# Patient Record
Sex: FEMALE | Race: ASIAN | Employment: OTHER | URBAN - METROPOLITAN AREA
[De-identification: names, ages, dates, MRNs, and addresses within clinical notes are randomized per-mention and may not be internally consistent; named-entity substitution may affect disease eponyms.]

---

## 2013-11-07 LAB — COLONOSCOPY, EXTERNAL: NORMAL

## 2017-01-30 DIAGNOSIS — M53.3 COCCYDYNIA: Primary | ICD-10-CM

## 2017-01-30 DIAGNOSIS — M53.3 DISORDERS OF SACRUM: Primary | ICD-10-CM

## 2017-01-30 RX ORDER — METAXALONE 800 MG/1
800 TABLET ORAL 3 TIMES DAILY
Qty: 30 TAB | Refills: 1 | Status: SHIPPED | OUTPATIENT
Start: 2017-01-30 | End: 2017-07-31 | Stop reason: SDUPTHER

## 2017-02-02 ENCOUNTER — TELEPHONE (OUTPATIENT)
Dept: INTERNAL MEDICINE CLINIC | Age: 72
End: 2017-02-02

## 2017-02-02 NOTE — TELEPHONE ENCOUNTER
[address] [facility]           Patient states he's returning your call. Please call.  Thank you

## 2017-02-09 ENCOUNTER — OFFICE VISIT (OUTPATIENT)
Dept: INTERNAL MEDICINE CLINIC | Age: 72
End: 2017-02-09

## 2017-02-09 VITALS
DIASTOLIC BLOOD PRESSURE: 75 MMHG | BODY MASS INDEX: 18.54 KG/M2 | OXYGEN SATURATION: 96 % | WEIGHT: 98.2 LBS | SYSTOLIC BLOOD PRESSURE: 120 MMHG | TEMPERATURE: 97.9 F | HEIGHT: 61 IN | HEART RATE: 72 BPM

## 2017-02-09 DIAGNOSIS — H26.9 CATARACT: Primary | ICD-10-CM

## 2017-02-09 NOTE — PROGRESS NOTES
HISTORY OF PRESENT ILLNESS  Dami Myers is a 66 y.o. female. HPI   Pt here need preop clearance for left cataract and glaucoma sx--Dr Birdie Malik  Has had right eye sx x 2--no complication with anesthesia or sedation  Feels ok  No cp sob or f/c  Only dry dry throat and pain in coccyx area    Patient Active Problem List    Diagnosis Date Noted    HLD (hyperlipidemia) 03/10/2014    Epigastric pain 11/07/2013    Blood in stool 11/07/2013    Constipation 11/07/2013    Occult blood positive stool 11/07/2013    Interstitial cystitis 02/24/2011    Asthma 02/12/2010    Fibromyalgia 02/12/2010    Osteoporosis 02/12/2010    Vitamin D deficiency 02/12/2010     Current Outpatient Prescriptions   Medication Sig Dispense Refill    montelukast (SINGULAIR) 10 mg tablet TK 1 T PO IN THE MAGDIEL  5    Magnesium Oxide 500 mg cap Take  by mouth.  CALCIUM CARBONATE/VITAMIN D3 (CALCIUM 600 + D PO) Take  by mouth.  metaxalone (SKELAXIN) 800 mg tablet Take 1 Tab by mouth three (3) times daily. 30 Tab 1    bimatoprost (LUMIGAN) 0.03 % ophthalmic drops Administer 1 drop to both eyes every evening.  brinzolamide (AZOPT) 1 % ophthalmic suspension Administer 1 Drop to both eyes three (3) times daily.  latanoprost (XALATAN) 0.005 % ophthalmic solution Administer 1 Drop to both eyes nightly.          No Known Allergies   Lab Results  Component Value Date/Time   WBC 5.5 09/10/2013 11:19 AM   HGB 12.9 09/10/2013 11:19 AM   HCT 40.1 09/10/2013 11:19 AM   PLATELET 029 48/91/8252 11:19 AM   MCV 96 09/10/2013 11:19 AM       Lab Results  Component Value Date/Time   GFR est AA 99 10/14/2015 09:52 AM   GFR est non-AA 86 10/14/2015 09:52 AM   Creatinine 0.71 10/14/2015 09:52 AM   BUN 14 10/14/2015 09:52 AM   Sodium 141 10/14/2015 09:52 AM   Potassium 5.0 10/14/2015 09:52 AM   Chloride 102 10/14/2015 09:52 AM   CO2 27 10/14/2015 09:52 AM         ROS    Physical Exam   Constitutional: She appears well-developed and well-nourished. Appears stated age   HENT:   Right upper lid ptosis   Eyes: Pupils are equal, round, and reactive to light. Neck: Normal range of motion. Neck supple. No JVD present. No tracheal deviation present. Cardiovascular: Normal rate, regular rhythm, normal heart sounds and intact distal pulses. Exam reveals no gallop and no friction rub. No murmur heard. Pulmonary/Chest: Effort normal and breath sounds normal. No respiratory distress. She has no wheezes. Abdominal: Soft. Bowel sounds are normal. She exhibits no mass. There is no tenderness. There is no rebound and no guarding. Musculoskeletal: She exhibits no edema. Lymphadenopathy:     She has no cervical adenopathy. Neurological: She is alert. Psychiatric: She has a normal mood and affect. Nursing note and vitals reviewed. ASSESSMENT and PLAN  Mani Newman was seen today for pre-op exam and sore throat.     Diagnoses and all orders for this visit:    Cataract   Acceptable candidate and risk for surgery   Will complete preop forms    Glaucoma   As per above      Follow-up Disposition: Not on File

## 2017-02-09 NOTE — MR AVS SNAPSHOT
Visit Information Date & Time Provider Department Dept. Phone Encounter #  
 2/9/2017 11:15 AM Arnulfo Gross, [address] [phone] [id number] Your Appointments 7/31/2017  9:30 AM  
ROUTINE CARE with Arnulfo Gross, [address] [facility]) Appt Note: 6 month follow up; r/s  
 [address] [address]  
[address] 235 [facility] [address] [place] [address] Upcoming Health Maintenance Date Due DTaP/Tdap/Td series (1 - Tdap) 7/7/1965 ZOSTER VACCINE AGE 60> 7/7/2004 Pneumococcal 65+ Low/Medium Risk (2 of 2 - PPSV23) 2/24/2016 BREAST CANCER SCRN MAMMOGRAM 3/24/2017 MEDICARE YEARLY EXAM 11/1/2017 GLAUCOMA SCREENING Q2Y 10/26/2018 COLONOSCOPY 11/7/2018 Allergies as of 2/9/2017  Review Complete On: 10/31/2016 By: Arnulfo Gross MD  
 No Known Allergies Current Immunizations  Reviewed on 10/31/2016 Name Date Influenza High Dose Vaccine PF 10/4/2016 Influenza Vaccine 9/10/2013 Influenza Vaccine Split 9/28/2011 Pneumococcal Vaccine (Unspecified Type) 2/24/2011 Not reviewed this visit You Were Diagnosed With   
  
 Codes Comments Cataract    -  Primary ICD-10-CM: H26.9 ICD-9-CM: 366.9 Vitals BP Pulse Temp Height(growth percentile) Weight(growth percentile) SpO2  
 120/75 (BP 1 Location: Left arm, BP Patient Position: Sitting) 72 97.9 °F (36.6 °C) (Oral) 5' 1\" (1.549 m) 98 lb 3.2 oz (44.5 kg) 96% BMI OB Status Smoking Status 18.55 kg/m2 Postmenopausal Never Smoker BMI and BSA Data Body Mass Index Body Surface Area 18.55 kg/m 2 1.38 m 2 Preferred Pharmacy Pharmacy Name Phone [facility], [address] [place], [place] [address]vd AT [facility] [phone] Your Updated Medication List  
  
   
 This list is accurate as of: 2/9/17 12:07 PM.  Always use your most recent med list.  
  
  
  
  
 AZOPT 1 % ophthalmic suspension Generic drug:  brinzolamide Administer 1 Drop to both eyes three (3) times daily. CALCIUM 600 + D PO Take  by mouth.  
  
 latanoprost 0.005 % ophthalmic solution Commonly known as:  Vertie  Administer 1 Drop to both eyes nightly. LUMIGAN 0.03 % ophthalmic drops Generic drug:  bimatoprost  
Administer 1 drop to both eyes every evening. Magnesium Oxide 500 mg Cap Take  by mouth.  
  
 metaxalone 800 mg tablet Commonly known as:  SKELAXIN Take 1 Tab by mouth three (3) times daily. montelukast 10 mg tablet Commonly known as:  SINGULAIR TK 1 T PO IN THE MAGDIEL hospitals & HEALTH SERVICES! Kayley Lerner introduces Hipcamp patient portal. Now you can access parts of your medical record, email your doctor's office, and request medication refills online. 1. In your internet browser, go to [email or web address] 2. Click on the First Time User? Click Here link in the Sign In box. You will see the New Member Sign Up page. 3. Enter your Hipcamp Access Code exactly as it appears below. You will not need to use this code after youve completed the sign-up process. If you do not sign up before the expiration date, you must request a new code. · Hipcamp Access Code: [email or web address] Expires: 5/10/2017 12:07 PM 
 
4. Enter the last four digits of your Social Security Number (xxxx) and Date of Birth (mm/dd/yyyy) as indicated and click Submit. You will be taken to the next sign-up page. 5. Create a Hipcamp ID. This will be your Hipcamp login ID and cannot be changed, so think of one that is secure and easy to remember. 6. Create a Hipcamp password. You can change your password at any time. 7. Enter your Password Reset Question and Answer. This can be used at a later time if you forget your password. 8. Enter your e-mail address. You will receive e-mail notification when new information is available in [address] 9. Click Sign Up. You can now view and download portions of your medical record. 10. Click the Download Summary menu link to download a portable copy of your medical information. If you have questions, please visit the Frequently Asked Questions section of the bTendo website. Remember, bTendo is NOT to be used for urgent needs. For medical emergencies, dial 911. Now available from your iPhone and Android! Please provide this summary of care documentation to your next provider. Your primary care clinician is listed as Ami PERERA. If you have any questions after today's visit, please call [phone].

## 2017-07-30 DIAGNOSIS — M79.7 FIBROMYALGIA: ICD-10-CM

## 2017-07-30 DIAGNOSIS — Z12.31 VISIT FOR SCREENING MAMMOGRAM: ICD-10-CM

## 2017-07-30 DIAGNOSIS — E78.00 PURE HYPERCHOLESTEROLEMIA: ICD-10-CM

## 2017-07-30 DIAGNOSIS — M85.80 OSTEOPENIA, UNSPECIFIED LOCATION: Primary | ICD-10-CM

## 2017-07-31 ENCOUNTER — OFFICE VISIT (OUTPATIENT)
Dept: INTERNAL MEDICINE CLINIC | Age: 72
End: 2017-07-31

## 2017-07-31 ENCOUNTER — HOSPITAL ENCOUNTER (OUTPATIENT)
Dept: LAB | Age: 72
Discharge: HOME | End: 2017-07-31
Payer: MEDICARE

## 2017-07-31 VITALS
HEART RATE: 70 BPM | HEIGHT: 61 IN | SYSTOLIC BLOOD PRESSURE: 111 MMHG | OXYGEN SATURATION: 98 % | BODY MASS INDEX: 18.88 KG/M2 | DIASTOLIC BLOOD PRESSURE: 68 MMHG | WEIGHT: 100 LBS | TEMPERATURE: 97.4 F

## 2017-07-31 DIAGNOSIS — M54.2 NECK PAIN: ICD-10-CM

## 2017-07-31 DIAGNOSIS — Z12.39 BREAST SCREENING: ICD-10-CM

## 2017-07-31 DIAGNOSIS — Z12.31 VISIT FOR SCREENING MAMMOGRAM: ICD-10-CM

## 2017-07-31 DIAGNOSIS — E78.00 PURE HYPERCHOLESTEROLEMIA: Primary | ICD-10-CM

## 2017-07-31 DIAGNOSIS — M81.0 OSTEOPOROSIS, UNSPECIFIED OSTEOPOROSIS TYPE, UNSPECIFIED PATHOLOGICAL FRACTURE PRESENCE: ICD-10-CM

## 2017-07-31 PROCEDURE — 84443 ASSAY THYROID STIM HORMONE: CPT

## 2017-07-31 PROCEDURE — 80048 BASIC METABOLIC PNL TOTAL CA: CPT

## 2017-07-31 PROCEDURE — 85027 COMPLETE CBC AUTOMATED: CPT

## 2017-07-31 PROCEDURE — 36415 COLL VENOUS BLD VENIPUNCTURE: CPT

## 2017-07-31 PROCEDURE — 80061 LIPID PANEL: CPT

## 2017-07-31 RX ORDER — CYCLOBENZAPRINE HCL 10 MG
10 TABLET ORAL
Qty: 90 TAB | Refills: 1 | OUTPATIENT
Start: 2017-07-31

## 2017-07-31 RX ORDER — METAXALONE 800 MG/1
800 TABLET ORAL 3 TIMES DAILY
Qty: 30 TAB | Refills: 1 | Status: SHIPPED | OUTPATIENT
Start: 2017-07-31 | End: 2017-07-31

## 2017-07-31 RX ORDER — CYCLOBENZAPRINE HCL 5 MG
5 TABLET ORAL
Qty: 30 TAB | Refills: 3 | Status: SHIPPED | OUTPATIENT
Start: 2017-07-31 | End: 2017-11-07

## 2017-07-31 NOTE — MR AVS SNAPSHOT
Visit Information Date & Time Provider Department Dept. Phone Encounter #  
 7/31/2017  9:30 AM Aylin Summers, [address] [place] [address],[address] [phone] [id number] Follow-up Instructions Return in about 6 months (around 1/31/2018) for hld fibromyalgia. Upcoming Health Maintenance Date Due DTaP/Tdap/Td series (1 - Tdap) 7/7/1965 ZOSTER VACCINE AGE 60> 5/7/2004 BREAST CANCER SCRN MAMMOGRAM 3/24/2017 INFLUENZA AGE 9 TO ADULT 8/1/2017 MEDICARE YEARLY EXAM 11/1/2017 GLAUCOMA SCREENING Q2Y 10/26/2018 COLONOSCOPY 11/7/2018 Allergies as of 7/31/2017  Review Complete On: 7/31/2017 By: Aylin Summers MD  
 No Known Allergies Current Immunizations  Reviewed on 10/31/2016 Name Date Influenza High Dose Vaccine PF 10/4/2016 Influenza Vaccine 9/10/2013 Influenza Vaccine Split 9/28/2011 ZZZ-RETIRED (DO NOT USE) Pneumococcal Vaccine (Unspecified Type) 2/24/2011 Not reviewed this visit You Were Diagnosed With   
  
 Codes Comments Pure hypercholesterolemia    -  Primary ICD-10-CM: E78.00 ICD-9-CM: 272.0 Osteoporosis, unspecified osteoporosis type, unspecified pathological fracture presence     ICD-10-CM: M81.0 ICD-9-CM: 733.00 Breast screening     ICD-10-CM: Z12.39 
ICD-9-CM: V76.10 Visit for screening mammogram     ICD-10-CM: Z12.31 
ICD-9-CM: V76.12 Neck pain     ICD-10-CM: M54.2 ICD-9-CM: 723.1 Vitals BP Pulse Temp Height(growth percentile) Weight(growth percentile) SpO2  
 111/68 (BP 1 Location: Left arm, BP Patient Position: Sitting) 70 97.4 °F (36.3 °C) (Oral) 5' 1\" (1.549 m) 100 lb (45.4 kg) 98% BMI OB Status Smoking Status 18.89 kg/m2 Postmenopausal Never Smoker BMI and BSA Data Body Mass Index Body Surface Area  
 18.89 kg/m 2 1.4 m 2 Preferred Pharmacy Pharmacy Name Phone [facility] Judge Lopez [address] [phone] Your Updated Medication List  
  
   
This list is accurate as of: 7/31/17 10:36 AM.  Always use your most recent med list.  
  
  
  
  
 AZOPT 1 % ophthalmic suspension Generic drug:  brinzolamide Administer 1 Drop to both eyes three (3) times daily. CALCIUM 600 + D PO Take  by mouth.  
  
 latanoprost 0.005 % ophthalmic solution Commonly known as:  Bonna Billow Administer 1 Drop to both eyes nightly. LUMIGAN 0.03 % ophthalmic drops Generic drug:  bimatoprost  
Administer 1 drop to both eyes every evening. Magnesium Oxide 500 mg Cap Take  by mouth.  
  
 metaxalone 800 mg tablet Commonly known as:  SKELAXIN Take 1 Tab by mouth three (3) times daily. montelukast 10 mg tablet Commonly known as:  SINGULAIR TK 1 T PO IN THE MAGDIEL PULMICORT FLEXHALER 180 mcg/actuation Aepb inhaler Generic drug:  budesonide Take  by inhalation. Prescriptions Sent to Pharmacy Refills  
 metaxalone (SKELAXIN) 800 mg tablet 1 Sig: Take 1 Tab by mouth three (3) times daily. Class: Normal  
 Pharmacy: [address] #: [phone] Route: Oral  
  
Follow-up Instructions Return in about 6 months (around 1/31/2018) for hld fibromyalgia. To-Do List   
 08/21/2017 Imaging:  [facility] MAMMO BI SCREENING INCL CAD Introducing [facility] & HEALTH SERVICES! Jasmin Deng introduces Quaero patient portal. Now you can access parts of your medical record, email your doctor's office, and request medication refills online. 1. In your internet browser, go to [email or web address] 2. Click on the First Time User? Click Here link in the Sign In box. You will see the New Member Sign Up page. 3. Enter your Quaero Access Code exactly as it appears below. You will not need to use this code after youve completed the sign-up process. If you do not sign up before the expiration date, you must request a new code. · Eco-Vacay Access Code: [email or web address] Expires: 10/29/2017 10:36 AM 
 
4. Enter the last four digits of your Social Security Number (xxxx) and Date of Birth (mm/dd/yyyy) as indicated and click Submit. You will be taken to the next sign-up page. 5. Create a Eco-Vacay ID. This will be your Eco-Vacay login ID and cannot be changed, so think of one that is secure and easy to remember. 6. Create a Eco-Vacay password. You can change your password at any time. 7. Enter your Password Reset Question and Answer. This can be used at a later time if you forget your password. 8. Enter your e-mail address. You will receive e-mail notification when new information is available in [address] 9. Click Sign Up. You can now view and download portions of your medical record. 10. Click the Download Summary menu link to download a portable copy of your medical information. If you have questions, please visit the Frequently Asked Questions section of the Eco-Vacay website. Remember, Eco-Vacay is NOT to be used for urgent needs. For medical emergencies, dial 911. Now available from your iPhone and Android! Please provide this summary of care documentation to your next provider. Your primary care clinician is listed as Vishnu PERERA. If you have any questions after today's visit, please call [phone].

## 2017-07-31 NOTE — PROGRESS NOTES
HISTORY OF PRESENT ILLNESS  Iban Ocampo is a 67 y.o. female. HPI   Pt here in f/u osteopenia and fibromyalgia  Had left cataract glaucoma surgery since last vist  Pt reports she has an appt in 2 months with Rheum MD Dr Josey Small for next prolia shot  Not taking calcium with vit d now  [facility] 1 hr every day  C/o pain in upper back area , worse at night. Request muscle relaxer for leg cramps   Still has pains in her feet and legs-no change  Walks 1 hour-almost every day  singulair has been effective for allergy sxs per pt  Not taking flexeril for muscle pain now  No cp or sob sxs      Patient Active Problem List    Diagnosis Date Noted    HLD (hyperlipidemia) 03/10/2014    Epigastric pain 11/07/2013    Blood in stool 11/07/2013    Constipation 11/07/2013    Occult blood positive stool 11/07/2013    Interstitial cystitis 02/24/2011    Asthma 02/12/2010    Fibromyalgia 02/12/2010    Osteoporosis 02/12/2010    Vitamin D deficiency 02/12/2010     Current Outpatient Prescriptions   Medication Sig Dispense Refill    budesonide (PULMICORT FLEXHALER) 180 mcg/actuation aepb inhaler Take  by inhalation.  cyclobenzaprine (FLEXERIL) 5 mg tablet Take 1 Tab by mouth three (3) times daily as needed for Muscle Spasm(s). 30 Tab 3    montelukast (SINGULAIR) 10 mg tablet TK 1 T PO IN THE MAGDIEL  5    Magnesium Oxide 500 mg cap Take  by mouth.  CALCIUM CARBONATE/VITAMIN D3 (CALCIUM 600 + D PO) Take  by mouth.  bimatoprost (LUMIGAN) 0.03 % ophthalmic drops Administer 1 drop to both eyes every evening.  brinzolamide (AZOPT) 1 % ophthalmic suspension Administer 1 Drop to both eyes three (3) times daily.  latanoprost (XALATAN) 0.005 % ophthalmic solution Administer 1 Drop to both eyes nightly.          No Known Allergies   Lab Results  Component Value Date/Time   Glucose 112 10/14/2015 09:52 AM   LDL, calculated 122 10/14/2015 09:52 AM   Creatinine 0.71 10/14/2015 09:52 AM      Lab Results  Component Value Date/Time   Cholesterol, total 219 10/14/2015 09:52 AM   HDL Cholesterol 76 10/14/2015 09:52 AM   LDL, calculated 122 10/14/2015 09:52 AM   Triglyceride 104 10/14/2015 09:52 AM     Lab Results  Component Value Date/Time   GFR est non-AA 86 10/14/2015 09:52 AM   GFR est AA 99 10/14/2015 09:52 AM   Creatinine 0.71 10/14/2015 09:52 AM   BUN 14 10/14/2015 09:52 AM   Sodium 141 10/14/2015 09:52 AM   Potassium 5.0 10/14/2015 09:52 AM   Chloride 102 10/14/2015 09:52 AM   CO2 27 10/14/2015 09:52 AM   PTH, Intact 50.6 02/12/2010 11:15 AM          ROS    Physical Exam   Constitutional: She appears well-developed and well-nourished. Appears stated age   Neck: Normal range of motion. Neck supple. FROM of neck   Cardiovascular: Normal rate, regular rhythm and normal heart sounds. Exam reveals no gallop and no friction rub. No murmur heard. Pulmonary/Chest: Effort normal and breath sounds normal. No respiratory distress. She has no wheezes. She has no rales. She exhibits no tenderness. Abdominal: Soft. Bowel sounds are normal.   Musculoskeletal: She exhibits no edema. Neurological: She is alert. Psychiatric: She has a normal mood and affect. Nursing note and vitals reviewed. ASSESSMENT and PLAN  Diagnoses and all orders for this visit:    1. Pure hypercholesterolemia   Managing with diet and exercise  2. Osteoporosis, unspecified osteoporosis type, unspecified pathological fracture presence   On prolia, calcium and vit d-Dr Spring  3. Breast screening    4. Visit for screening mammogram  -     [facility] MAMMO BI SCREENING INCL CAD; Future   Advised pt to get mammogram     5. Neck pain   C/w strain   Heat tabby, flexeril prn  Other orders  -     cyclobenzaprine (FLEXERIL) 5 mg tablet; Take 1 Tab by mouth three (3) times daily as needed for Muscle Spasm(s). Follow-up Disposition:  Return in about 6 months (around 1/31/2018) for hld fibromyalgia.

## 2017-07-31 NOTE — TELEPHONE ENCOUNTER
[facility] needs to change a medication. Cyclobenzaprine will be covered, not metaxalone. Please call to ok this change due to insurance.

## 2017-07-31 NOTE — PROGRESS NOTES
Patient is here for her 6 month follow up for   Fibromyalgia and osteoporosis. Patient is complaining of  Her left shoulder and back .

## 2017-08-01 LAB
BUN SERPL-MCNC: 13 MG/DL (ref 8–27)
BUN/CREAT SERPL: 21 (ref 12–28)
CALCIUM SERPL-MCNC: 9.5 MG/DL (ref 8.7–10.3)
CHLORIDE SERPL-SCNC: 101 MMOL/L (ref 96–106)
CHOLEST SERPL-MCNC: 263 MG/DL (ref 100–199)
CO2 SERPL-SCNC: 28 MMOL/L (ref 18–29)
CREAT SERPL-MCNC: 0.61 MG/DL (ref 0.57–1)
ERYTHROCYTE [DISTWIDTH] IN BLOOD BY AUTOMATED COUNT: 13.6 % (ref 12.3–15.4)
GLUCOSE SERPL-MCNC: 93 MG/DL (ref 65–99)
HCT VFR BLD AUTO: 41.3 % (ref 34–46.6)
HDLC SERPL-MCNC: 93 MG/DL
HGB BLD-MCNC: 13.5 G/DL (ref 11.1–15.9)
LDLC SERPL CALC-MCNC: 153 MG/DL (ref 0–99)
MCH RBC QN AUTO: 31.2 PG (ref 26.6–33)
MCHC RBC AUTO-ENTMCNC: 32.7 G/DL (ref 31.5–35.7)
MCV RBC AUTO: 95 FL (ref 79–97)
PLATELET # BLD AUTO: 232 X10E3/UL (ref 150–379)
POTASSIUM SERPL-SCNC: 5.2 MMOL/L (ref 3.5–5.2)
RBC # BLD AUTO: 4.33 X10E6/UL (ref 3.77–5.28)
SODIUM SERPL-SCNC: 143 MMOL/L (ref 134–144)
TRIGL SERPL-MCNC: 87 MG/DL (ref 0–149)
TSH SERPL DL<=0.005 MIU/L-ACNC: 2.43 UIU/ML (ref 0.45–4.5)
VLDLC SERPL CALC-MCNC: 17 MG/DL (ref 5–40)
WBC # BLD AUTO: 5.1 X10E3/UL (ref 3.4–10.8)

## 2017-11-07 ENCOUNTER — OFFICE VISIT (OUTPATIENT)
Dept: INTERNAL MEDICINE CLINIC | Age: 72
End: 2017-11-07

## 2017-11-07 VITALS
HEART RATE: 89 BPM | DIASTOLIC BLOOD PRESSURE: 62 MMHG | TEMPERATURE: 97.9 F | OXYGEN SATURATION: 96 % | WEIGHT: 100 LBS | SYSTOLIC BLOOD PRESSURE: 103 MMHG | HEIGHT: 61 IN | BODY MASS INDEX: 18.88 KG/M2

## 2017-11-07 DIAGNOSIS — R25.2 CRAMPS, MUSCLE, GENERAL: Primary | ICD-10-CM

## 2017-11-07 RX ORDER — METAXALONE 800 MG/1
800 TABLET ORAL
Qty: 30 TAB | Refills: 1 | Status: SHIPPED | OUTPATIENT
Start: 2017-11-07 | End: 2017-11-17

## 2017-11-07 RX ORDER — GABAPENTIN 100 MG/1
300 CAPSULE ORAL
Qty: 90 CAP | Refills: 3 | Status: SHIPPED | OUTPATIENT
Start: 2017-11-07 | End: 2018-09-21

## 2017-11-07 NOTE — MR AVS SNAPSHOT
Visit Information Date & Time Provider Department Dept. Phone Encounter #  
 11/7/2017  1:45 PM Eloymaximus Voss, [address] [phone] [id number] Your Appointments 2/5/2018 11:00 AM  
ROUTINE CARE with Eloymaximus Voss, [address] [facility]) Appt Note: 6 month follow up  
 [address] [facility] [address] [address]  
[address] [facility] [address] Upcoming Health Maintenance Date Due DTaP/Tdap/Td series (1 - Tdap) 7/7/1965 ZOSTER VACCINE AGE 60> 5/7/2004 BREAST CANCER SCRN MAMMOGRAM 3/24/2017 Influenza Age 5 to Adult 8/1/2017 MEDICARE YEARLY EXAM 11/1/2017 GLAUCOMA SCREENING Q2Y 10/26/2018 COLONOSCOPY 11/7/2018 Allergies as of 11/7/2017  Review Complete On: 11/7/2017 By: Teresa Vsos MD  
 No Known Allergies Current Immunizations  Reviewed on 10/31/2016 Name Date Influenza High Dose Vaccine PF 10/4/2016 Influenza Vaccine 9/10/2013 Influenza Vaccine Split 9/28/2011 ZZZ-RETIRED (DO NOT USE) Pneumococcal Vaccine (Unspecified Type) 2/24/2011 Not reviewed this visit You Were Diagnosed With   
  
 Codes Comments Cramps, muscle, general    -  Primary ICD-10-CM: R25.2 ICD-9-CM: 729.82 Vitals BP Pulse Temp Height(growth percentile) Weight(growth percentile) SpO2  
 103/62 (BP 1 Location: Left arm, BP Patient Position: Sitting) 89 97.9 °F (36.6 °C) (Oral) 5' 1\" (1.549 m) 100 lb (45.4 kg) 96% BMI OB Status Smoking Status 18.89 kg/m2 Postmenopausal Never Smoker BMI and BSA Data Body Mass Index Body Surface Area  
 18.89 kg/m 2 1.4 m 2 Preferred Pharmacy Pharm[facility], [address] [phone] Your Updated Medication List  
  
   
This list is accurate as of: 11/7/17  2:41 PM.  Always use your most recent med list.  
  
  
  
  
 AZOPT 1 % ophthalmic suspension Generic drug:  brinzolamide Administer 1 Drop to both eyes three (3) times daily. CALCIUM 600 + D PO Take  by mouth.  
  
 gabapentin 100 mg capsule Commonly known as:  NEURONTIN Take 3 Caps by mouth nightly. latanoprost 0.005 % ophthalmic solution Commonly known as:  Henreitta Dibbles Administer 1 Drop to both eyes nightly. LUMIGAN 0.03 % ophthalmic drops Generic drug:  bimatoprost  
Administer 1 drop to both eyes every evening. Magnesium Oxide 500 mg Cap Take  by mouth.  
  
 metaxalone 800 mg tablet Commonly known as:  SKELAXIN Take 1 Tab by mouth three (3) times daily as needed for Pain for up to 10 days. montelukast 10 mg tablet Commonly known as:  SINGULAIR TK 1 T PO IN THE MAGDIEL PULMICORT FLEXHALER 180 mcg/actuation Aepb inhaler Generic drug:  budesonide Take  by inhalation. Prescriptions Sent to Pharmacy Refills  
 metaxalone (SKELAXIN) 800 mg tablet 1 Sig: Take 1 Tab by mouth three (3) times daily as needed for Pain for up to 10 days. Class: Normal  
 Pharmacy: [address] Ph #: [phone] Route: Oral  
 gabapentin (NEURONTIN) 100 mg capsule 3 Sig: Take 3 Caps by mouth nightly. Class: Normal  
 Pharmacy: [address] Ph #: [phone] Route: Oral  
  
Introducing [facility] & HEALTH SERVICES! David Leyva introduces Snapt patient portal. Now you can access parts of your medical record, email your doctor's office, and request medication refills online. 1. In your internet browser, go to [email or web address] 2. Click on the First Time User? Click Here link in the Sign In box. You will see the New Member Sign Up page. 3. Enter your Snapt Access Code exactly as it appears below. You will not need to use this code after youve completed the sign-up process.  If you do not sign up before the expiration date, you must request a new code. · LAVEGO Access Code: [email or web address] Expires: 2/5/2018  2:41 PM 
 
4. Enter the last four digits of your Social Security Number (xxxx) and Date of Birth (mm/dd/yyyy) as indicated and click Submit. You will be taken to the next sign-up page. 5. Create a LAVEGO ID. This will be your LAVEGO login ID and cannot be changed, so think of one that is secure and easy to remember. 6. Create a LAVEGO password. You can change your password at any time. 7. Enter your Password Reset Question and Answer. This can be used at a later time if you forget your password. 8. Enter your e-mail address. You will receive e-mail notification when new information is available in [address] 9. Click Sign Up. You can now view and download portions of your medical record. 10. Click the Download Summary menu link to download a portable copy of your medical information. If you have questions, please visit the Frequently Asked Questions section of the LAVEGO website. Remember, LAVEGO is NOT to be used for urgent needs. For medical emergencies, dial 911. Now available from your iPhone and Android! Please provide this summary of care documentation to your next provider. Your primary care clinician is listed as Herberth PERERA. If you have any questions after today's visit, please call [phone].

## 2017-11-07 NOTE — PROGRESS NOTES
HISTORY OF PRESENT ILLNESS  Rajinder Nguyen is a 67 y.o. female. HPI     C/o worsening cramps all over -hands, feet , legs. Legs feels worse at night last few days and nights  Muscle twisting sensation. Stopped taking flexeril which was not helping  Not sleeping well d/t leg cramps  Unable to walk much yesterday  1-2 weeks ago was feeling better  Exercise helps some  Has chronic upper back pain    Patient Active Problem List    Diagnosis Date Noted    HLD (hyperlipidemia) 03/10/2014    Epigastric pain 11/07/2013    Blood in stool 11/07/2013    Constipation 11/07/2013    Occult blood positive stool 11/07/2013    Interstitial cystitis 02/24/2011    Asthma 02/12/2010    Fibromyalgia 02/12/2010    Osteoporosis 02/12/2010    Vitamin D deficiency 02/12/2010     Current Outpatient Prescriptions   Medication Sig Dispense Refill    metaxalone (SKELAXIN) 800 mg tablet Take 1 Tab by mouth three (3) times daily as needed for Pain for up to 10 days. 30 Tab 1    gabapentin (NEURONTIN) 100 mg capsule Take 3 Caps by mouth nightly. 90 Cap 3    montelukast (SINGULAIR) 10 mg tablet TK 1 T PO IN THE MAGDIEL  5    Magnesium Oxide 500 mg cap Take  by mouth.  CALCIUM CARBONATE/VITAMIN D3 (CALCIUM 600 + D PO) Take  by mouth.  budesonide (PULMICORT FLEXHALER) 180 mcg/actuation aepb inhaler Take  by inhalation.  bimatoprost (LUMIGAN) 0.03 % ophthalmic drops Administer 1 drop to both eyes every evening.  brinzolamide (AZOPT) 1 % ophthalmic suspension Administer 1 Drop to both eyes three (3) times daily.  latanoprost (XALATAN) 0.005 % ophthalmic solution Administer 1 Drop to both eyes nightly.          No Known Allergies   Lab Results  Component Value Date/Time   GFR est non-AA 90 07/31/2017 10:56 AM   GFR est  07/31/2017 10:56 AM   Creatinine 0.61 07/31/2017 10:56 AM   BUN 13 07/31/2017 10:56 AM   Sodium 143 07/31/2017 10:56 AM   Potassium 5.2 07/31/2017 10:56 AM   Chloride 101 07/31/2017 10:56 AM CO2 28 07/31/2017 10:56 AM   PTH, Intact 50.6 02/12/2010 11:15 AM        ROS    Physical Exam   Constitutional: She appears well-developed and well-nourished. Appears stated age   Cardiovascular: Normal rate, regular rhythm and normal heart sounds. Exam reveals no gallop and no friction rub. No murmur heard. Pulmonary/Chest: Effort normal and breath sounds normal. No respiratory distress. She has no wheezes. Abdominal: Soft. Bowel sounds are normal.   Musculoskeletal: She exhibits no edema. Intact UE and LE strength   Neurological: She is alert. Psychiatric: She has a normal mood and affect. Nursing note and vitals reviewed. ASSESSMENT and PLAN  Diagnoses and all orders for this visit:    1. Cramps, muscle, general   Reset , fluids, stretching   Skelaxin tid prn   neurontinn 100 mg hs titrate up to 300 mg hs for leg cramps  Other orders  -     metaxalone (SKELAXIN) 800 mg tablet; Take 1 Tab by mouth three (3) times daily as needed for Pain for up to 10 days.  -     gabapentin (NEURONTIN) 100 mg capsule; Take 3 Caps by mouth nightly.       Follow-up Disposition: Not on File

## 2017-11-08 RX ORDER — BACLOFEN 10 MG/1
10 TABLET ORAL 3 TIMES DAILY
Qty: 30 TAB | Refills: 1 | Status: SHIPPED | OUTPATIENT
Start: 2017-11-08 | End: 2018-09-21

## 2017-12-01 ENCOUNTER — OFFICE VISIT (OUTPATIENT)
Dept: INTERNAL MEDICINE CLINIC | Age: 72
End: 2017-12-01

## 2017-12-01 VITALS
OXYGEN SATURATION: 96 % | SYSTOLIC BLOOD PRESSURE: 102 MMHG | HEIGHT: 61 IN | TEMPERATURE: 97.5 F | WEIGHT: 100 LBS | BODY MASS INDEX: 18.88 KG/M2 | HEART RATE: 102 BPM | DIASTOLIC BLOOD PRESSURE: 59 MMHG

## 2017-12-01 DIAGNOSIS — K13.70 ORAL LESION: Primary | ICD-10-CM

## 2017-12-01 DIAGNOSIS — R25.2 CRAMPS, MUSCLE, GENERAL: ICD-10-CM

## 2017-12-01 RX ORDER — METAXALONE 800 MG/1
800 TABLET ORAL
Qty: 30 TAB | Refills: 3 | Status: SHIPPED | OUTPATIENT
Start: 2017-12-01 | End: 2018-09-21

## 2017-12-01 NOTE — MR AVS SNAPSHOT
Visit Information Date & Time Provider Department Dept. Phone Encounter #  
 12/1/2017  3:00 PM Amaya Lee, [address] [phone] [id number] Follow-up Instructions Return if symptoms worsen or fail to improve. Your Appointments 2/5/2018 11:00 AM  
ROUTINE CARE with Amayacassy Kwoks, [address] [facility]) Appt Note: 6 month follow up  
 [address] [id number]  
[address] [facility] [address] [place] [address] Upcoming Health Maintenance Date Due DTaP/Tdap/Td series (1 - Tdap) 7/7/1965 ZOSTER VACCINE AGE 60> 5/7/2004 BREAST CANCER SCRN MAMMOGRAM 3/24/2017 Influenza Age 5 to Adult 8/1/2017 MEDICARE YEARLY EXAM 11/1/2017 GLAUCOMA SCREENING Q2Y 10/26/2018 Allergies as of 12/1/2017  Review Complete On: 11/7/2017 By: Amaya Lee MD  
 No Known Allergies Current Immunizations  Reviewed on 10/31/2016 Name Date Influenza High Dose Vaccine PF 10/4/2016 Influenza Vaccine 9/10/2013 Influenza Vaccine Split 9/28/2011 ZZZ-RETIRED (DO NOT USE) Pneumococcal Vaccine (Unspecified Type) 2/24/2011 Not reviewed this visit You Were Diagnosed With   
  
 Codes Comments Oral lesion    -  Primary ICD-10-CM: K13.70 ICD-9-CM: 528.9 Cramps, muscle, general     ICD-10-CM: R25.2 ICD-9-CM: 729.82 Vitals BP Pulse Temp Height(growth percentile) Weight(growth percentile) SpO2  
 102/59 (BP 1 Location: Left arm, BP Patient Position: Sitting) (!) 102 97.5 °F (36.4 °C) (Oral) 5' 1\" (1.549 m) 100 lb (45.4 kg) 96% BMI OB Status Smoking Status 18.89 kg/m2 Postmenopausal Never Smoker BMI and BSA Data Body Mass Index Body Surface Area  
 18.89 kg/m 2 1.4 m 2 Preferred Pharmacy Pharmacy Name Phone [facility] [address] [facility], [address] [phone] Your Updated Medication List  
  
   
This list is accurate as of: 12/1/17  3:51 PM.  Always use your most recent med list.  
  
  
  
  
 baclofen 10 mg tablet Commonly known as:  LIORESAL Take 1 Tab by mouth three (3) times daily. CALCIUM 600 + D PO Take  by mouth.  
  
 gabapentin 100 mg capsule Commonly known as:  NEURONTIN Take 3 Caps by mouth nightly. Magnesium Oxide 500 mg Cap Take  by mouth.  
  
 metaxalone 800 mg tablet Commonly known as:  SKELAXIN Take 1 Tab by mouth three (3) times daily as needed for Pain.  
  
 montelukast 10 mg tablet Commonly known as:  SINGULAIR TK 1 T PO IN THE MAGDIEL PULMICORT FLEXHALER 180 mcg/actuation Aepb inhaler Generic drug:  budesonide Take  by inhalation. Prescriptions Sent to Pharmacy Refills  
 metaxalone (SKELAXIN) 800 mg tablet 3 Sig: Take 1 Tab by mouth three (3) times daily as needed for Pain. Class: Normal  
 Pharmacy: [address] #: [phone] Route: Oral  
  
We Performed the Following REFERRAL TO ORAL MAXILLOFACIAL SURGERY [REF59 Custom] Follow-up Instructions Return if symptoms worsen or fail to improve. Referral Information Referral ID Referred By Referred To  
  
 [id number] [address] [facility] [address] [place] [address] Visits Status Start Date End Date 1 New Request 12/1/17 12/1/18 If your referral has a status of pending review or denied, additional information will be sent to support the outcome of this decision. Introducing [facility]! Chon Hernández introduces [employer] patient portal. Now you can access parts of your medical record, email your doctor's office, and request medication refills online. 1. In your internet browser, go to [email or web address]. [email or web address] 2. Click on the First Time User? Click Here link in the Sign In box.  You will see the New Member Sign Up page. 3. Enter your [employer] Access Code exactly as it appears below. You will not need to use this code after youve completed the sign-up process. If you do not sign up before the expiration date, you must request a new code. · [employer] Access Code: [email or web address] Expires: 2/5/2018  2:41 PM 
 
4. Enter the last four digits of your Social Security Number (xxxx) and Date of Birth (mm/dd/yyyy) as indicated and click Submit. You will be taken to the next sign-up page. 5. Create a [employer] ID. This will be your [employer] login ID and cannot be changed, so think of one that is secure and easy to remember. 6. Create a [employer] password. You can change your password at any time. 7. Enter your Password Reset Question and Answer. This can be used at a later time if you forget your password. 8. Enter your e-mail address. You will receive e-mail notification when new information is available in [address] 9. Click Sign Up. You can now view and download portions of your medical record. 10. Click the Download Summary menu link to download a portable copy of your medical information. If you have questions, please visit the Frequently Asked Questions section of the [employer] website. Remember, [employer] is NOT to be used for urgent needs. For medical emergencies, dial 911. Now available from your iPhone and Android! Please provide this summary of care documentation to your next provider. Your primary care clinician is listed as Marylu PERERA. If you have any questions after today's visit, please call [phone].

## 2017-12-01 NOTE — PROGRESS NOTES
HISTORY OF PRESENT ILLNESS  Erich Anderson is a 67 y.o. female. HPI   Pt continues to c/o of generalized aches and muscle pain but also generalized cramps  Baclofen prescribed last month was not effective  neurontin caused mental confusion  Walking and stretching helps  Also c/o chronic growth on right buccal mucosa x years  but getting larger and painful now    Patient Active Problem List    Diagnosis Date Noted    HLD (hyperlipidemia) 03/10/2014    Epigastric pain 11/07/2013    Blood in stool 11/07/2013    Constipation 11/07/2013    Occult blood positive stool 11/07/2013    Interstitial cystitis 02/24/2011    Asthma 02/12/2010    Fibromyalgia 02/12/2010    Osteoporosis 02/12/2010    Vitamin D deficiency 02/12/2010     Current Outpatient Prescriptions   Medication Sig Dispense Refill    metaxalone (SKELAXIN) 800 mg tablet Take 1 Tab by mouth three (3) times daily as needed for Pain. 30 Tab 3    budesonide (PULMICORT FLEXHALER) 180 mcg/actuation aepb inhaler Take  by inhalation.  montelukast (SINGULAIR) 10 mg tablet TK 1 T PO IN THE MAGDIEL  5    Magnesium Oxide 500 mg cap Take  by mouth.  CALCIUM CARBONATE/VITAMIN D3 (CALCIUM 600 + D PO) Take  by mouth.  baclofen (LIORESAL) 10 mg tablet Take 1 Tab by mouth three (3) times daily. 30 Tab 1    gabapentin (NEURONTIN) 100 mg capsule Take 3 Caps by mouth nightly.  90 Cap 3     No Known Allergies   Lab Results  Component Value Date/Time   WBC 5.1 07/31/2017 10:56 AM   HGB 13.5 07/31/2017 10:56 AM   HCT 41.3 07/31/2017 10:56 AM   PLATELET 700 34/04/6167 10:56 AM   MCV 95 07/31/2017 10:56 AM     Lab Results  Component Value Date/Time   GFR est non-AA 90 07/31/2017 10:56 AM   GFR est  07/31/2017 10:56 AM   Creatinine 0.61 07/31/2017 10:56 AM   BUN 13 07/31/2017 10:56 AM   Sodium 143 07/31/2017 10:56 AM   Potassium 5.2 07/31/2017 10:56 AM   Chloride 101 07/31/2017 10:56 AM   CO2 28 07/31/2017 10:56 AM   PTH, Intact 50.6 02/12/2010 11:15 AM     Lab Results  Component Value Date/Time   TSH 2.430 07/31/2017 10:56 AM           ROS    Physical Exam   Constitutional: She appears well-developed and well-nourished. No distress. Appears stated age   HENT:   Raised whitish growth of tissue right buccal mucosa   Cardiovascular: Normal rate, regular rhythm and normal heart sounds. Exam reveals no gallop and no friction rub. No murmur heard. Pulmonary/Chest: Effort normal and breath sounds normal. No respiratory distress. She has no wheezes. Abdominal: Soft. Bowel sounds are normal.   Musculoskeletal: Normal range of motion. She exhibits no edema. Neurological: She is alert. Psychiatric: She has a normal mood and affect. Nursing note and vitals reviewed. ASSESSMENT and PLAN  Diagnoses and all orders for this visit:    1. Oral lesion  -     REFERRAL TO ORAL MAXILLOFACIAL SURGERY    2. Cramps, muscle, general   Try to authorize skelaxin, baclofen was not effective  Other orders  -     metaxalone (SKELAXIN) 800 mg tablet; Take 1 Tab by mouth three (3) times daily as needed for Pain. Follow-up Disposition:  Return if symptoms worsen or fail to improve.

## 2017-12-08 RX ORDER — TIZANIDINE 4 MG/1
4 TABLET ORAL
Qty: 90 TAB | Refills: 1 | Status: SHIPPED | OUTPATIENT
Start: 2017-12-08 | End: 2018-03-16 | Stop reason: SDUPTHER

## 2018-02-03 NOTE — PROGRESS NOTES
HISTORY OF PRESENT ILLNESS  Amanuel Wall is a 67 y.o. female. HPI   Pt here in f/u osteopenia and fibromyalgia  Referred to oral surgeon for lesion on right buccal mucosa last visit  Skelaxin prescribed for muscle spasms  On prolia for osteoporosis T scores improving--osteopenia range-Dr. Valladares Grandchild      Mammogram is due but pt declines mammogram again today        Last year :  Had left cataract glaucoma surgery since last vist  Pt reports she has an appt in 2 months with Rheum MD Dr Fernanda Greenberg for next prolia shot  Not taking calcium with vit d now  [facility] 1 hr every day  C/o pain in upper back area , worse at night. Request muscle relaxer for leg cramps   Still has pains in her feet and legs-no change  Walks 1 hour-almost every day  singulair has been effective for allergy sxs per pt  Not taking flexeril for muscle pain now  No cp or sob sxs    Patient Active Problem List    Diagnosis Date Noted    HLD (hyperlipidemia) 03/10/2014    Epigastric pain 11/07/2013    Blood in stool 11/07/2013    Constipation 11/07/2013    Occult blood positive stool 11/07/2013    Interstitial cystitis 02/24/2011    Asthma 02/12/2010    Fibromyalgia 02/12/2010    Osteoporosis 02/12/2010    Vitamin D deficiency 02/12/2010     Current Outpatient Prescriptions   Medication Sig Dispense Refill    tiZANidine (ZANAFLEX) 4 mg tablet Take 1 Tab by mouth every six (6) hours as needed. 90 Tab 1    metaxalone (SKELAXIN) 800 mg tablet Take 1 Tab by mouth three (3) times daily as needed for Pain. 30 Tab 3    baclofen (LIORESAL) 10 mg tablet Take 1 Tab by mouth three (3) times daily. 30 Tab 1    gabapentin (NEURONTIN) 100 mg capsule Take 3 Caps by mouth nightly. 90 Cap 3    budesonide (PULMICORT FLEXHALER) 180 mcg/actuation aepb inhaler Take  by inhalation.  montelukast (SINGULAIR) 10 mg tablet TK 1 T PO IN THE MAGDIEL  5    Magnesium Oxide 500 mg cap Take  by mouth.  CALCIUM CARBONATE/VITAMIN D3 (CALCIUM 600 + D PO) Take  by mouth. No Known Allergies   Lab Results  Component Value Date/Time   Glucose 93 07/31/2017 10:56 AM   LDL, calculated 153 07/31/2017 10:56 AM   Creatinine 0.61 07/31/2017 10:56 AM      Lab Results  Component Value Date/Time   Cholesterol, total 263 07/31/2017 10:56 AM   HDL Cholesterol 93 07/31/2017 10:56 AM   LDL, calculated 153 07/31/2017 10:56 AM   Triglyceride 87 07/31/2017 10:56 AM     Lab Results  Component Value Date/Time   GFR est non-AA 90 07/31/2017 10:56 AM   GFR est  07/31/2017 10:56 AM   Creatinine 0.61 07/31/2017 10:56 AM   BUN 13 07/31/2017 10:56 AM   Sodium 143 07/31/2017 10:56 AM   Potassium 5.2 07/31/2017 10:56 AM   Chloride 101 07/31/2017 10:56 AM   CO2 28 07/31/2017 10:56 AM   PTH, Intact 50.6 02/12/2010 11:15 AM        ROS    Physical Exam   Constitutional: She appears well-developed and well-nourished. Appears stated age   Cardiovascular: Normal rate, regular rhythm and normal heart sounds. Exam reveals no gallop and no friction rub. No murmur heard. Pulmonary/Chest: Effort normal and breath sounds normal. No respiratory distress. She has no wheezes. Abdominal: Soft. Bowel sounds are normal.   Musculoskeletal: She exhibits no edema. Neurological: She is alert. Psychiatric: She has a normal mood and affect. Nursing note and vitals reviewed. ASSESSMENT and PLAN  Diagnoses and all orders for this visit:    1. Osteoporosis, unspecified osteoporosis type, unspecified pathological fracture presence    2. Fibromyalgia    3. Breast screening    4. Medicare annual wellness visit, subsequent    Other orders  -     lidocaine (LIDODERM) 5 %; 1 Patch by TransDERmal route every twelve (12) hours. Apply patch to the affected area for 12 hours a day and remove for 12 hours a day. Follow-up Disposition:  Return in about 6 months (around 8/5/2018) for hld fibro labs.     This is a Subsequent Medicare Annual Wellness Exam (AWV) (Performed 12 months after IPPE or effective date of Medicare Part B enrollment)    I have reviewed the patient's medical history in detail and updated the computerized patient record. History     Past Medical History:   Diagnosis Date    Asthma     Blood in stool 11/7/2013    Constipation 11/7/2013    Epigastric pain 11/7/2013    Occult blood positive stool 11/7/2013      Past Surgical History:   Procedure Laterality Date    COLONOSCOPY  3/19/08    HX HEENT Right     right eye cataract and glaucoma surgery     Current Outpatient Prescriptions   Medication Sig Dispense Refill    baclofen (LIORESAL) 10 mg tablet Take 1 Tab by mouth three (3) times daily. 30 Tab 1    gabapentin (NEURONTIN) 100 mg capsule Take 3 Caps by mouth nightly. 90 Cap 3    budesonide (PULMICORT FLEXHALER) 180 mcg/actuation aepb inhaler Take  by inhalation.  montelukast (SINGULAIR) 10 mg tablet TK 1 T PO IN THE MAGDIEL  5    Magnesium Oxide 500 mg cap Take  by mouth.  tiZANidine (ZANAFLEX) 4 mg tablet Take 1 Tab by mouth every six (6) hours as needed. 90 Tab 1    metaxalone (SKELAXIN) 800 mg tablet Take 1 Tab by mouth three (3) times daily as needed for Pain. 30 Tab 3    CALCIUM CARBONATE/VITAMIN D3 (CALCIUM 600 + D PO) Take  by mouth.        No Known Allergies  Family History   Problem Relation Age of Onset    Cancer Mother      liver cancer    Stroke Father     No Known Problems Sister     No Known Problems Brother     No Known Problems Brother      Social History   Substance Use Topics    Smoking status: Never Smoker    Smokeless tobacco: Never Used    Alcohol use No     Patient Active Problem List   Diagnosis Code    Asthma J45.909    Fibromyalgia M79.7    Osteoporosis M81.0    Vitamin D deficiency E55.9    Interstitial cystitis N30.10    Epigastric pain R10.13    Blood in stool K92.1    Constipation K59.00    Occult blood positive stool R19.5    HLD (hyperlipidemia) E78.5       Depression Risk Factor Screening:     PHQ over the last two weeks 2/5/2018 Little interest or pleasure in doing things Not at all   Feeling down, depressed or hopeless Not at all   Total Score PHQ 2 0     Alcohol Risk Factor Screening: You do not drink alcohol or very rarely. Functional Ability and Level of Safety:   Hearing Loss  Hearing is good. Activities of Daily Living  The home contains: no safety equipment. Patient does total self care    Fall Risk  Fall Risk Assessment, last 12 mths 2/5/2018   Able to walk? Yes   Fall in past 12 months? Yes   Fall with injury? No   Number of falls in past 12 months 4   Fall Risk Score 4       Abuse Screen  Patient is not abused    Cognitive Screening   Evaluation of Cognitive Function:  Has your family/caregiver stated any concerns about your memory: no  Normal    Patient Care Team   Patient Care Team:  Boo Leo MD as PCP - Lakshmi Mark MD (Gastroenterology)  Rashaad Simmons MD (Urology)  Ty Zamora MD (Otolaryngology)  Merry Michael MD (Gynecology)  Nelly Mejia MD (Ophthalmology)  Quan Castellanos RN as Nurse Navigator  Mallory Phillips MD as Physician (Internal Medicine)    Assessment/Plan   Education and counseling provided:  Are appropriate based on today's review and evaluation  End-of-Life planning (with patient's consent)-see ACP note  tdap and shingles vaccine rcommended    Diagnoses and all orders for this visit:    1. Osteoporosis, unspecified osteoporosis type, unspecified pathological fracture presence   On prolia per rheum MD  2. Fibromyalgia    ES tylenol hs prn back pain   rx lidoderm patch to upper back  3.  Breast screening   Pt declines mammogram   Advised BSE      Health Maintenance Due   Topic Date Due    DTaP/Tdap/Td series (1 - Tdap) 07/07/1965    ZOSTER VACCINE AGE 60>  05/07/2004    BREAST CANCER SCRN MAMMOGRAM  03/24/2017    Influenza Age 9 to Adult  08/01/2017    MEDICARE YEARLY EXAM  11/01/2017

## 2018-02-05 ENCOUNTER — OFFICE VISIT (OUTPATIENT)
Dept: INTERNAL MEDICINE CLINIC | Age: 73
End: 2018-02-05

## 2018-02-05 VITALS
DIASTOLIC BLOOD PRESSURE: 60 MMHG | BODY MASS INDEX: 19.07 KG/M2 | SYSTOLIC BLOOD PRESSURE: 96 MMHG | HEART RATE: 75 BPM | HEIGHT: 61 IN | TEMPERATURE: 97.4 F | OXYGEN SATURATION: 98 % | WEIGHT: 101 LBS

## 2018-02-05 DIAGNOSIS — M79.7 FIBROMYALGIA: ICD-10-CM

## 2018-02-05 DIAGNOSIS — Z12.39 BREAST SCREENING: ICD-10-CM

## 2018-02-05 DIAGNOSIS — Z00.00 MEDICARE ANNUAL WELLNESS VISIT, SUBSEQUENT: ICD-10-CM

## 2018-02-05 DIAGNOSIS — M81.0 OSTEOPOROSIS, UNSPECIFIED OSTEOPOROSIS TYPE, UNSPECIFIED PATHOLOGICAL FRACTURE PRESENCE: Primary | ICD-10-CM

## 2018-02-05 RX ORDER — LIDOCAINE 50 MG/G
1 PATCH TOPICAL EVERY 12 HOURS
Qty: 30 EACH | Refills: 3 | Status: SHIPPED | OUTPATIENT
Start: 2018-02-05 | End: 2018-09-21

## 2018-02-05 NOTE — PATIENT INSTRUCTIONS

## 2018-02-05 NOTE — MR AVS SNAPSHOT
[address] [place] [address] 
[phone] Patient: Magalys Tracey MRN: [id number] [id number] Visit Information Date & Time Provider Department Dept. Phone Encounter #  
 2/5/2018 11:00 AM Michial Needs, [address] [place] [address],[address] [phone] [id number] Follow-up Instructions Return in about 6 months (around 8/5/2018) for hld fibro labs. Upcoming Health Maintenance Date Due DTaP/Tdap/Td series (1 - Tdap) 7/7/1965 ZOSTER VACCINE AGE 60> 5/7/2004 BREAST CANCER SCRN MAMMOGRAM 3/24/2017 Influenza Age 5 to Adult 8/1/2017 MEDICARE YEARLY EXAM 11/1/2017 GLAUCOMA SCREENING Q2Y 10/26/2018 Allergies as of 2/5/2018  Review Complete On: 2/5/2018 By: Joellen Fung LPN No Known Allergies Current Immunizations  Reviewed on 10/31/2016 Name Date Influenza High Dose Vaccine PF 10/4/2016 Influenza Vaccine 9/10/2013 Influenza Vaccine Split 9/28/2011 ZZZ-RETIRED (DO NOT USE) Pneumococcal Vaccine (Unspecified Type) 2/24/2011 Not reviewed this visit You Were Diagnosed With   
  
 Codes Comments Osteoporosis, unspecified osteoporosis type, unspecified pathological fracture presence    -  Primary ICD-10-CM: M81.0 ICD-9-CM: 733.00 Fibromyalgia     ICD-10-CM: M79.7 ICD-9-CM: 729.1 Breast screening     ICD-10-CM: Z12.31 
ICD-9-CM: V76.10 Vitals BP Pulse Temp Height(growth percentile) Weight(growth percentile) SpO2  
 96/60 (BP 1 Location: Left arm, BP Patient Position: Sitting) 75 97.4 °F (36.3 °C) (Oral) 5' 1\" (1.549 m) 101 lb (45.8 kg) 98% BMI OB Status Smoking Status 19.08 kg/m2 Postmenopausal Never Smoker Vitals History BMI and BSA Data Body Mass Index Body Surface Area 19.08 kg/m 2 1.4 m 2 Preferred Pharmacy Pharmacy Name Phone [facility] Judge Lopez [address] [phone] Your Updated Medication List  
  
   
This list is accurate as of: 18 12:01 PM.  Always use your most recent med list.  
  
  
  
  
 baclofen 10 mg tablet Commonly known as:  LIORESAL Take 1 Tab by mouth three (3) times daily. CALCIUM 600 + D PO Take  by mouth.  
  
 gabapentin 100 mg capsule Commonly known as:  NEURONTIN Take 3 Caps by mouth nightly. lidocaine 5 % Commonly known as:  LIDODERM  
1 Patch by TransDERmal route every twelve (12) hours. Apply patch to the affected area for 12 hours a day and remove for 12 hours a day. Magnesium Oxide 500 mg Cap Take  by mouth.  
  
 metaxalone 800 mg tablet Commonly known as:  SKELAXIN Take 1 Tab by mouth three (3) times daily as needed for Pain.  
  
 montelukast 10 mg tablet Commonly known as:  SINGULAIR TK 1 T PO IN THE MAGDIEL PULMICORT FLEXHALER 180 mcg/actuation Aepb inhaler Generic drug:  budesonide Take  by inhalation. tiZANidine 4 mg tablet Commonly known as:  Caleen Glatter Take 1 Tab by mouth every six (6) hours as needed. Prescriptions Sent to Pharmacy Refills  
 lidocaine (LIDODERM) 5 % 3 Si Patch by TransDERmal route every twelve (12) hours. Apply patch to the affected area for 12 hours a day and remove for 12 hours a day. Class: Normal  
 Pharmacy: [address] #: [phone] Route: TransDERmal  
  
Follow-up Instructions Return in about 6 months (around 2018) for hld fibro labs. Patient Instructions Medicare Wellness Visit, Female The best way to live healthy is to have a healthy lifestyle by eating a well-balanced diet, exercising regularly, limiting alcohol and stopping smoking. Regular physical exams and screening tests are another way to keep healthy. Preventive exams provided by your health care provider can find health problems before they become diseases or illnesses.  Preventive services including immunizations, screening tests, monitoring and exams can help you take care of your own health. All people over age 72 should have a pneumovax  and and a prevnar shot to prevent pneumonia. These are once in a lifetime unless you and your provider decide differently. All people over 65 should have a yearly flu shot and a tetanus vaccine every 10 years. A bone mass density to screen for osteoporosis or thinning of the bones should be done every 2 years after 65. Screening for diabetes mellitus with a blood sugar test should be done every year. Glaucoma is a disease of the eye due to increased ocular pressure that can lead to blindness and it should be done every year by an eye professional. 
 
Cardiovascular screening tests that check for elevated lipids (fatty part of blood) which can lead to heart disease and strokes should be done every 5 years. Colorectal screening that evaluates for blood or polyps in your colon should be done yearly as a stool test or every five years as a flexible sigmoidoscope or every 10 years as a colonoscopy up to age 76. Breast cancer screening with a mammogram is recommended biennially  for women age 54-69. Screening for cervical cancer with a pap smear and pelvic exam is recommended for women after age 72 years every 2 years up to age 79 or when the provider and patient decide to stop. If there is a history of cervical abnormalities or other increased risk for cancer then the test is recommended yearly. Hepatitis C screening is also recommended for anyone born between 80 through [address]. A shingles vaccine is also recommended once in a lifetime after age 61. Your Medicare Wellness Exam is recommended annually. Here is a list of your current Health Maintenance items with a due date: 
Health Maintenance Due Topic Date Due  
 DTaP/Tdap/Td  (1 - Tdap) 07/07/1965  Shingles Vaccine  05/07/2004  Breast Cancer Screening  03/24/2017  Flu Vaccine  08/01/2017 [address] [place] [address] Annual Well Visit  11/01/2017 Introducing [facility] & HEALTH SERVICES! [employer] introduces WeatherBug patient portal. Now you can access parts of your medical record, email your doctor's office, and request medication refills online. 1. In your internet browser, go to [email or web address] 2. Click on the First Time User? Click Here link in the Sign In box. You will see the New Member Sign Up page. 3. Enter your WeatherBug Access Code exactly as it appears below. You will not need to use this code after youve completed the sign-up process. If you do not sign up before the expiration date, you must request a new code. · WeatherBug Access Code: [email or web address] Expires: 2/5/2018  2:41 PM 
 
4. Enter the last four digits of your Social Security Number (xxxx) and Date of Birth (mm/dd/yyyy) as indicated and click Submit. You will be taken to the next sign-up page. 5. Create a WeatherBug ID. This will be your WeatherBug login ID and cannot be changed, so think of one that is secure and easy to remember. 6. Create a WeatherBug password. You can change your password at any time. 7. Enter your Password Reset Question and Answer. This can be used at a later time if you forget your password. 8. Enter your e-mail address. You will receive e-mail notification when new information is available in [address] 9. Click Sign Up. You can now view and download portions of your medical record. 10. Click the Download Summary menu link to download a portable copy of your medical information. If you have questions, please visit the Frequently Asked Questions section of the WeatherBug website. Remember, WeatherBug is NOT to be used for urgent needs. For medical emergencies, dial 911. Now available from your iPhone and Android! Please provide this summary of care documentation to your next provider. Your primary care clinician is listed as Dm PERERA.  If you have any questions after today's visit, please call [phone].

## 2018-02-07 ENCOUNTER — PATIENT OUTREACH (OUTPATIENT)
Dept: INTERNAL MEDICINE CLINIC | Age: 73
End: 2018-02-07

## 2018-02-07 NOTE — Clinical Note
Waiting to hear back from [facility] regarding a [facility] copy of AMD.

## 2018-02-07 NOTE — PROGRESS NOTES
Was asked by Dr. Radha Huff to instruct pt and her , who is also his pt on an honoring choices visit. Spoke to pt. She does not have the blue HCV folder. Pt speaks Lal and does not understand all the language in the actual [place] Directive for [facility] document. Email sent to Dana Wallace, ACP instructor regarding availability of a [facility] form. Received [facility] version of AMD. Marked it as a sample and mailed it and the HCV blue folder to pt with phone number to call [phone].

## 2018-02-08 NOTE — ACP (ADVANCE CARE PLANNING)
Spoke to pt yesterday who stated her and her  both need document and will be each others agent. Asked for Lal version of AMD as she does not understand the [place] version. Obtained Lal version that was mailed to pt today with the HCV blue folder.

## 2018-02-12 ENCOUNTER — TELEPHONE (OUTPATIENT)
Dept: INTERNAL MEDICINE CLINIC | Age: 73
End: 2018-02-12

## 2018-02-12 NOTE — TELEPHONE ENCOUNTER
Spoke with patients  from [place] after 2 patient identifiers being note and she advised that she just needed some info to ask for tier exception for patient, I advised I could take care of that if she wanted, she declined. She states she will call back if she has ,more questions.

## 2018-02-12 NOTE — TELEPHONE ENCOUNTER
Anamaria Glass//[facility]ong [facility]. Walk In, needs a call back in reference to patient's medication not being on formulary & needs to discuss medication change. Please call.  Thank you

## 2018-03-07 ENCOUNTER — PATIENT OUTREACH (OUTPATIENT)
Dept: INTERNAL MEDICINE CLINIC | Age: 73
End: 2018-03-07

## 2018-03-07 ENCOUNTER — CLINICAL SUPPORT (OUTPATIENT)
Dept: INTERNAL MEDICINE CLINIC | Age: 73
End: 2018-03-07

## 2018-03-07 DIAGNOSIS — Z71.89 ACP (ADVANCE CARE PLANNING): Primary | ICD-10-CM

## 2018-03-07 NOTE — PROGRESS NOTES
Called and reminded pt of appt for her and her  this morning at 10:30 for [facility]. Pt confirmed and will bring the blue folder she was given.

## 2018-03-07 NOTE — ACP (ADVANCE CARE PLANNING)
[address] [facility]     Prior to today's conversation, did individual have existing ACP documents? [] Yes  [x] No  If Yes, type of document: n/a    Date of conversation: 3/7/18   Location: PCP office-[facility] [place]    Participants: (in person or by phone)   [x] Patient    [] Prospective agent (not yet named in a document) / Other surrogate decision  maker / next of kin    Name: n/a    Relationship to patient:n/a       [] Healthcare agent (already designated in prior ACP document)    Name: n/a    Relationship to patient:n/a       [x] Other:  Name: spouse Joe Frank    [id number] Fears/Concerns about planning:none     Goals/Narrative of Conversation: does not want CPR; Is pretty old and if it is time to die wants to pass naturally    Conversation topics for Individual    Has learned the following from prior experiences with serious illness:has not had any experiences; her mother was in a NH and  peacefully overnight. She did not have an advance directive. Identifies the following as important for living well:doing whatever she wants to    \"What cultural, Orthodox, spiritual, or personal beliefs (if any) do you have that might help you choose the care you want, or do not want? \"  Patient response: none; is Congregational    Conversation topics for Agent / [address] [place], [place] [address] agent's understanding of the role: Judy Anderson who lives in [place]; pt stated she contacted her daughter and expressed her wishes. Attempted to contact Jess Connell long distance. She did not answer.      Agent confirms Willingness to: pt per daughter Jess Connell is willing to be agent   []Yes []No Accept role   [] Yes []No Talk with individual about his/her goals, values, & preferences   [] Yes [] No   Follow individual's decisions, even if do not agree with those    decisions   []Yes  []No Make decisions in difficult moments      Topics for Chronic Illness (if applicable): [x] N/A    \"What do you understand about your (name of illness) and the complications that may occur? \"  Patient response:     \"What do you understand about CPR? \" Patient response: read over the CPR information card and pt stated she does not want CPR. Pt will discuss with Dr. Jerome Castellanos at next visit. \"What would be an unacceptable outcome of CPR to you? \" Patient response: pt does not want CPR    [x] Yes  [] No   Educated patient regarding differences between AMD and DDNR  [x] Yes  [] No   If patient requested DDNR order, referred to provider for follow-up    First Steps® ACP Facilitator: Philip Espinoza RN    Length (minutes): 60    The following was provided (check all that apply):   [x] Written information on the planning process      Healthcare Decision Information Cards:   [x] Help with Breathing Facts   [x] Tube Feeding Facts   [x] CPR Facts      [x] Review of advance directive form.  Pt was also provided a Lal version of an advance medical directive as her main language is Lal   [] Review of existing advance directive       Meeting Outcomes:   [x] ACP discussion completed   [x] Advance directive form completed   [x] Original of completed advance directive given to patient   [x] Copy given to healthcare agent; mailed to son and daughter   [x] Copy scanned to electronic medical record    If ACP discussion not completed, last interview topic discussed: n/a     Follow-up plan:     [] Schedule follow-up conversation to continue planning   [] Referred individual to provider for additional questions/concerns    [] Individual will invite agent/prospective agent to next ACP appointment   [] Recommended to review completed AMD annually or upon change in health status     [x] This note routed to one or more involved healthcare providers- Dr. Gaviria [facility]  Date Time Provider Tien Calvo   8/6/2018 11:00 AM Vin Porter  E Moa Son Appointment My Department:  2/5/2018

## 2018-03-16 RX ORDER — TIZANIDINE 4 MG/1
TABLET ORAL
Qty: 90 TAB | Refills: 1 | Status: SHIPPED | OUTPATIENT
Start: 2018-03-16 | End: 2018-09-21

## 2018-03-19 ENCOUNTER — TELEPHONE (OUTPATIENT)
Dept: INTERNAL MEDICINE CLINIC | Age: 73
End: 2018-03-19

## 2018-03-19 NOTE — TELEPHONE ENCOUNTER
Attempted to call patient regarding her mammogram.  Patient refused to verify her name and . Advised patient that I could not speak with her if she would not verify her identity.   Patient hung up

## 2018-05-09 ENCOUNTER — HOSPITAL ENCOUNTER (OUTPATIENT)
Dept: CT IMAGING | Age: 73
Discharge: HOME | End: 2018-05-09
Attending: INTERNAL MEDICINE
Payer: MEDICARE

## 2018-05-09 ENCOUNTER — OFFICE VISIT (OUTPATIENT)
Dept: INTERNAL MEDICINE CLINIC | Age: 73
End: 2018-05-09

## 2018-05-09 VITALS
TEMPERATURE: 97.4 F | SYSTOLIC BLOOD PRESSURE: 120 MMHG | HEART RATE: 65 BPM | OXYGEN SATURATION: 100 % | BODY MASS INDEX: 18.5 KG/M2 | HEIGHT: 61 IN | WEIGHT: 98 LBS | DIASTOLIC BLOOD PRESSURE: 60 MMHG

## 2018-05-09 DIAGNOSIS — R53.83 FATIGUE, UNSPECIFIED TYPE: ICD-10-CM

## 2018-05-09 DIAGNOSIS — R55 SYNCOPE, UNSPECIFIED SYNCOPE TYPE: Primary | ICD-10-CM

## 2018-05-09 DIAGNOSIS — S09.90XA INJURY OF HEAD, INITIAL ENCOUNTER: ICD-10-CM

## 2018-05-09 DIAGNOSIS — R53.1 WEAKNESS GENERALIZED: ICD-10-CM

## 2018-05-09 DIAGNOSIS — R42 DIZZY: ICD-10-CM

## 2018-05-09 PROCEDURE — 70450 CT HEAD/BRAIN W/O DYE: CPT

## 2018-05-09 NOTE — MR AVS SNAPSHOT
[address] [facility] 
[phone] Patient: Jose Luis Aly MRN: [id number] KHADRA: Visit Information Date & Time Provider Department Dept. Phone Encounter #  
 2018 10:45 AM Jumana Santiago, [address] [place] [phone] [id number] Follow-up Instructions Return in about 1 month (around 2018), or if symptoms worsen or fail to improve, for f/u weakness, syncope. Your Appointments 2018 11:00 AM  
ROUTINE CARE with Jumana Santiago [address] [facility] Appt Note: 6 month follow up  
 [address] [address]  
[address] [facility]  [address] [place] [address] Upcoming Health Maintenance Date Due DTaP/Tdap/Td series (1 - Tdap) 1966 ZOSTER VACCINE AGE 60> 2004 BREAST CANCER SCRN MAMMOGRAM 3/24/2017 Influenza Age 5 to Adult 2018 GLAUCOMA SCREENING Q2Y 10/26/2018 MEDICARE YEARLY EXAM 2019 Allergies as of 2018  Review Complete On: 2018 By: Mary Antonio LPN No Known Allergies Current Immunizations  Reviewed on 10/31/2016 Name Date Influenza High Dose Vaccine PF 10/4/2016 Influenza Vaccine 9/10/2013 Influenza Vaccine Split 2011 ZZZ-RETIRED (DO NOT USE) Pneumococcal Vaccine (Unspecified Type) 2011 Zoster Recombinant 2018 Not reviewed this visit You Were Diagnosed With   
  
 Codes Comments Syncope, unspecified syncope type    -  Primary ICD-10-CM: R55 
ICD-9-CM: 780.2 Dizzy     ICD-10-CM: R44 ICD-9-CM: 780.4 Fatigue, unspecified type     ICD-10-CM: R53.83 ICD-9-CM: 780.79 Injury of head, initial encounter     ICD-10-CM: S09. 90XA ICD-9-CM: 959.01 Weakness generalized     ICD-10-CM: R53.1 ICD-9-CM: 780.79 Vitals BP Pulse Temp Height(growth percentile) Weight(growth percentile) SpO2  
 120/60 65 97.4 °F (36.3 °C) (Oral) 5' 1\" (1.549 m) 98 lb (44.5 kg) 100% BMI OB Status Smoking Status 18.52 kg/m2 Postmenopausal Never Smoker Vitals History BMI and BSA Data Body Mass Index Body Surface Area 18.52 kg/m 2 1.38 m 2 Preferred Pharmacy Pharmacy Name Phone [address] [place] [address] [phone] Your Updated Medication List  
  
   
This list is accurate as of 5/9/18 11:19 AM.  Always use your most recent med list.  
  
  
  
  
 baclofen 10 mg tablet Commonly known as:  LIORESAL Take 1 Tab by mouth three (3) times daily. CALCIUM 600 + D PO Take  by mouth.  
  
 gabapentin 100 mg capsule Commonly known as:  NEURONTIN Take 3 Caps by mouth nightly. lidocaine 5 % Commonly known as:  LIDODERM  
1 Patch by TransDERmal route every twelve (12) hours. Apply patch to the affected area for 12 hours a day and remove for 12 hours a day. Magnesium Oxide 500 mg Cap Take 300 mg by mouth. 1 tab a day  
  
 metaxalone 800 mg tablet Commonly known as:  SKELAXIN Take 1 Tab by mouth three (3) times daily as needed for Pain.  
  
 montelukast 10 mg tablet Commonly known as:  SINGULAIR TK 1 T PO IN THE MAGDIEL PULMICORT FLEXHALER 180 mcg/actuation Aepb inhaler Generic drug:  budesonide Take  by inhalation. tiZANidine 4 mg tablet Commonly known as:  Ilean Scarlet TAKE 1 TABLET BY MOUTH EVERY SIX (6) HOURS AS NEEDED. We Performed the Following AMB POC EKG ROUTINE W/ 12 LEADS, INTER & REP [26977 CPT(R)] CBC W/O DIFF [79055 CPT(R)] METABOLIC PANEL, COMPREHENSIVE [73367 CPT(R)] REFERRAL TO NEUROLOGY [SZJ27 Custom] [facility] 3RD GENERATION [10280 CPT(R)] Follow-up Instructions Return in about 1 month (around 6/9/2018), or if symptoms worsen or fail to improve, for f/u weakness, syncope. To-Do List   
 05/09/2018 Imaging:  CT HEAD WO CONT Referral Information Referral ID Referred By Referred To  
  
 [id number] Ace PERERA MD   
   [address] [facility] [address] [facility], [address] Phone: [phone] Fax: [phone] Visits Status Start Date End Date 1 New Request 5/9/18 5/9/19 If your referral has a status of pending review or denied, additional information will be sent to support the outcome of this decision. Introducing [facility] & HEALTH SERVICES! [facility] introduces NewsBreak patient portal. Now you can access parts of your medical record, email your doctor's office, and request medication refills online. 1. In your internet browser, go to [email or web address] 2. Click on the First Time User? Click Here link in the Sign In box. You will see the New Member Sign Up page. 3. Enter your NewsBreak Access Code exactly as it appears below. You will not need to use this code after youve completed the sign-up process. If you do not sign up before the expiration date, you must request a new code. · NewsBreak Access Code: [email or web address] Expires: 8/7/2018 11:19 AM 
 
4. Enter the last four digits of your Social Security Number (xxxx) and Date of Birth (mm/dd/yyyy) as indicated and click Submit. You will be taken to the next sign-up page. 5. Create a ShareSquaret ID. This will be your NewsBreak login ID and cannot be changed, so think of one that is secure and easy to remember. 6. Create a NewsBreak password. You can change your password at any time. 7. Enter your Password Reset Question and Answer. This can be used at a later time if you forget your password. 8. Enter your e-mail address. You will receive e-mail notification when new information is available in [address] 9. Click Sign Up. You can now view and download portions of your medical record. 10. Click the Download Summary menu link to download a portable copy of your medical information. If you have questions, please visit the Frequently Asked Questions section of the Hi-Dis(Mosen) website. Remember, Hi-Dis(Mosen) is NOT to be used for urgent needs. For medical emergencies, dial 911. Now available from your iPhone and Android! Please provide this summary of care documentation to your next provider. Your primary care clinician is listed as Jesus PERERA. If you have any questions after today's visit, please call [phone].

## 2018-05-09 NOTE — PROGRESS NOTES
Chief Complaint   Patient presents with    Loss of Consciousness     while in bath tube appox.  18 days ago per patient    Facial Pain     right side of face    Head Pain     right side of head    Knee Pain     Bruise  on left knee    Shoulder Pain     right shoudler happen syncope epsoide    Depression     nearly everyday    Dizziness     everyday per patient

## 2018-05-09 NOTE — PROGRESS NOTES
HISTORY OF PRESENT ILLNESS  Edward Limon is a 67 y.o. female. HPI   Acute care visit. was in bathtub 18d ago and found sitting in bathtub semialert. Her  carried her to the bed and she eventually awoke, she and  not sure how long she may have been asleep of LOC  C/o some bruising and pain right side of head and face-has pain on right side of her face  Some bruising of left knee and right buttocks-resolved  Takes zanaflex bid for cramps but not effective,s he is not sure if the med makes her feel sleepy  No cp or sob  C/o generalized pain and weakness chronic but getting worse  Sees Rheum MD for hx fibromyalgia  Had had a few lb weight loss  Patient Active Problem List    Diagnosis Date Noted    HLD (hyperlipidemia) 03/10/2014    Epigastric pain 11/07/2013    Blood in stool 11/07/2013    Constipation 11/07/2013    Occult blood positive stool 11/07/2013    Interstitial cystitis 02/24/2011    Asthma 02/12/2010    Fibromyalgia 02/12/2010    Osteoporosis 02/12/2010    Vitamin D deficiency 02/12/2010     Current Outpatient Prescriptions   Medication Sig Dispense Refill    tiZANidine (ZANAFLEX) 4 mg tablet TAKE 1 TABLET BY MOUTH EVERY SIX (6) HOURS AS NEEDED. 90 Tab 1    lidocaine (LIDODERM) 5 % 1 Patch by TransDERmal route every twelve (12) hours. Apply patch to the affected area for 12 hours a day and remove for 12 hours a day. 30 Each 3    metaxalone (SKELAXIN) 800 mg tablet Take 1 Tab by mouth three (3) times daily as needed for Pain. 30 Tab 3    baclofen (LIORESAL) 10 mg tablet Take 1 Tab by mouth three (3) times daily. 30 Tab 1    gabapentin (NEURONTIN) 100 mg capsule Take 3 Caps by mouth nightly. 90 Cap 3    budesonide (PULMICORT FLEXHALER) 180 mcg/actuation aepb inhaler Take  by inhalation.  montelukast (SINGULAIR) 10 mg tablet TK 1 T PO IN THE MAGDIEL  5    Magnesium Oxide 500 mg cap Take  by mouth.  CALCIUM CARBONATE/VITAMIN D3 (CALCIUM 600 + D PO) Take  by mouth.        No Known Allergies   Lab Results  Component Value Date/Time   WBC 5.1 07/31/2017 10:56 AM   HGB 13.5 07/31/2017 10:56 AM   HCT 41.3 07/31/2017 10:56 AM   PLATELET 923 06/04/9320 10:56 AM   MCV 95 07/31/2017 10:56 AM     Lab Results  Component Value Date/Time   GFR est non-AA 90 07/31/2017 10:56 AM   GFR est  07/31/2017 10:56 AM   Creatinine 0.61 07/31/2017 10:56 AM   BUN 13 07/31/2017 10:56 AM   Sodium 143 07/31/2017 10:56 AM   Potassium 5.2 07/31/2017 10:56 AM   Chloride 101 07/31/2017 10:56 AM   CO2 28 07/31/2017 10:56 AM   PTH, Intact 50.6 02/12/2010 11:15 AM        ROS    Physical Exam   Constitutional: She appears well-developed and well-nourished. Appears stated age   Cardiovascular: Normal rate, regular rhythm and normal heart sounds. Exam reveals no gallop and no friction rub. No murmur heard. Pulmonary/Chest: Effort normal and breath sounds normal. No respiratory distress. She has no wheezes. Abdominal: Soft. Bowel sounds are normal.   Musculoskeletal: She exhibits no edema. Neurological: She is alert. Psychiatric: She has a normal mood and affect. Nursing note and vitals reviewed. ASSESSMENT and PLAN  Diagnoses and all orders for this visit:    1. Syncope, unspecified syncope type  -     CBC W/O DIFF  -     METABOLIC PANEL, COMPREHENSIVE  -     TSH 3RD GENERATION  -     AMB POC EKG ROUTINE W/ 12 LEADS, INTER & REP-nsr nst. No chest pain symtpoms  -     Anant Card [facility]    2. Dizzy  -     CBC W/O DIFF  -     METABOLIC PANEL, COMPREHENSIVE  -     TSH 3RD GENERATION   D/c tizanidine for now  3. Fatigue, unspecified type  -     CBC W/O DIFF  -     METABOLIC PANEL, COMPREHENSIVE  -     TSH 3RD GENERATION    D/c tizanidine    4. Injury of head, initial encounter  -     CT HEAD WO CONT; Future    5.  Weakness generalized  -     Zach Shelley Neurology ref ED [facility]      Follow-up Disposition:  Return in about 1 month (around 6/9/2018), or if symptoms worsen or fail to improve, for f/u weakness, syncope.

## 2018-06-25 ENCOUNTER — OFFICE VISIT (OUTPATIENT)
Dept: URGENT CARE | Age: 73
End: 2018-06-25

## 2018-06-25 VITALS
SYSTOLIC BLOOD PRESSURE: 116 MMHG | TEMPERATURE: 97.5 F | HEIGHT: 61 IN | RESPIRATION RATE: 16 BRPM | WEIGHT: 98 LBS | BODY MASS INDEX: 18.5 KG/M2 | HEART RATE: 84 BPM | OXYGEN SATURATION: 97 % | DIASTOLIC BLOOD PRESSURE: 66 MMHG

## 2018-06-25 DIAGNOSIS — R23.8 SCALP IRRITATION: Primary | ICD-10-CM

## 2018-06-25 RX ORDER — CLOBETASOL PROPIONATE 0.46 MG/ML
SOLUTION TOPICAL
Qty: 1 BOTTLE | Refills: 0 | Status: SHIPPED | OUTPATIENT
Start: 2018-06-25 | End: 2018-06-30

## 2018-06-25 NOTE — PROGRESS NOTES
Patient is a 67 y.o. female presenting with skin problem. Skin Problem   This is a new problem. The current episode started more than 1 week ago. The problem occurs daily. The problem has not changed since onset. Associated symptoms include headaches. Exacerbated by: touching on area of scalp and around it  Nothing relieves the symptoms. She has tried nothing for the symptoms. Past Medical History:   Diagnosis Date    Asthma     Blood in stool 11/7/2013    Constipation 11/7/2013    Epigastric pain 11/7/2013    Occult blood positive stool 11/7/2013        Past Surgical History:   Procedure Laterality Date    COLONOSCOPY  3/19/08    HX HEENT Right     right eye cataract and glaucoma surgery         Family History   Problem Relation Age of Onset    Cancer Mother      liver cancer    Stroke Father     No Known Problems Sister     No Known Problems Brother     No Known Problems Brother         Social History     Social History    Marital status:      Spouse name: N/A    Number of children: N/A    Years of education: N/A     Occupational History    Not on file. Social History Main Topics    Smoking status: Never Smoker    Smokeless tobacco: Never Used    Alcohol use No    Drug use: Yes     Special: Prescription, OTC    Sexual activity: Yes     Partners: Male     Other Topics Concern    Not on file     Social History Narrative                ALLERGIES: Review of patient's allergies indicates no known allergies. Review of Systems   Neurological: Positive for headaches. All other systems reviewed and are negative. Vitals:    06/25/18 1630   BP: 116/66   Pulse: 84   Resp: 16   Temp: 97.5 °F (36.4 °C)   SpO2: 97%   Weight: 98 lb (44.5 kg)   Height: 5' 1\" (1.549 m)       Physical Exam   Skin: Lesion (small keratotic lesion on scalp - with paresthesia surrounding it- no local erythema/ swelling ) noted. Nursing note and vitals reviewed.       MDM    Procedures      ICD-10-CM ICD-9-CM    1. Scalp irritation R23.8 709.9     local hyperkeratotic area with paresthesia on and around it      Medications Ordered Today   Medications    clobetasol (TEMOVATE) 0.05 % external solution     Sig: Apply to affected area of scalp twice a day     Dispense:  1 Bottle     Refill:  0     No results found for any visits on 06/25/18. The patients condition was discussed with the patient and they understand. The patient is to follow up with primary care doctor. If signs and symptoms become worse the pt is to go to the ER. The patient is to take medications as prescribed.

## 2018-06-25 NOTE — MR AVS SNAPSHOT
[address] [facility] [address] 
[phone] Patient: Latha Day MRN: [id number] Visit Information Date & Time Provider Department Dept. Phone Encounter #  
 6/25/2018  4:00 PM [facility] [phone] [id number] Upcoming Health Maintenance Date Due DTaP/Tdap/Td series (1 - Tdap) 7/7/1965 ZOSTER VACCINE AGE 60> 5/7/2004 BREAST CANCER SCRN MAMMOGRAM 3/24/2017 Influenza Age 5 to Adult 8/1/2018 GLAUCOMA SCREENING Q2Y 10/26/2018 Allergies as of 6/25/2018  Review Complete On: 6/25/2018 By: Elise King RN No Known Allergies Current Immunizations  Reviewed on 10/31/2016 Name Date Influenza High Dose Vaccine PF 10/4/2016 Influenza Vaccine 9/10/2013 Influenza Vaccine Split 9/28/2011 ZZZ-RETIRED (DO NOT USE) Pneumococcal Vaccine (Unspecified Type) 2/24/2011 Zoster Recombinant 2/18/2018 Not reviewed this visit You Were Diagnosed With   
  
 Codes Comments Scalp irritation    -  Primary ICD-10-CM: R23.8 ICD-9-CM: 709.9 local hyperkeratotic area with paresthesia on and around it Vitals BP Pulse Temp Resp Height(growth percentile) Weight(growth percentile) 116/66 84 97.5 °F (36.4 °C) 16 5' 1\" (1.549 m) 98 lb (44.5 kg) SpO2 BMI OB Status Smoking Status 97% 18.52 kg/m2 Postmenopausal Never Smoker BMI and BSA Data Body Mass Index Body Surface Area 18.52 kg/m 2 1.38 m 2 Preferred Pharmacy Pharmacy Name Phone [address] [phone] Your Updated Medication List  
  
   
This list is accurate as of 6/25/18  5:05 PM.  Always use your most recent med list.  
  
  
  
  
 baclofen 10 mg tablet Commonly known as:  LIORESAL Take 1 Tab by mouth three (3) times daily. CALCIUM 600 + D PO Take  by mouth. clobetasol 0.05 % external solution Commonly known as:  Cipriano Roof Apply to affected area of scalp twice a day  
  
 gabapentin 100 mg capsule Commonly known as:  NEURONTIN Take 3 Caps by mouth nightly. lidocaine 5 % Commonly known as:  LIDODERM  
1 Patch by TransDERmal route every twelve (12) hours. Apply patch to the affected area for 12 hours a day and remove for 12 hours a day. Magnesium Oxide 500 mg Cap Take 300 mg by mouth. 1 tab a day  
  
 metaxalone 800 mg tablet Commonly known as:  SKELAXIN Take 1 Tab by mouth three (3) times daily as needed for Pain.  
  
 montelukast 10 mg tablet Commonly known as:  SINGULAIR TK 1 T PO IN THE MAGDIEL PULMICORT FLEXHALER 180 mcg/actuation Aepb inhaler Generic drug:  budesonide Take  by inhalation. tiZANidine 4 mg tablet Commonly known as:  Chad Bartlett TAKE 1 TABLET BY MOUTH EVERY SIX (6) HOURS AS NEEDED. Prescriptions Sent to Pharmacy Refills  
 clobetasol (TEMOVATE) 0.05 % external solution 0 Sig: Apply to affected area of scalp twice a day Class: Normal  
 Pharmacy: [address] [place] [address] Ph #: [phone] Introducing \[facility]\"" & HEALTH SERVICES! [employer] introduces PT PAL patient portal. Now you can access parts of your medical record, email your doctor's office, and request medication refills online. 1. In your internet browser, go to [email or web address] 2. Click on the First Time User? Click Here link in the Sign In box. You will see the New Member Sign Up page. 3. Enter your PT PAL Access Code exactly as it appears below. You will not need to use this code after youve completed the sign-up process. If you do not sign up before the expiration date, you must request a new code. · PT PAL Access Code: [email or web address] Expires: 8/7/2018 11:19 AM 
 
4.  Enter the last four digits of your Social Security Number (xxxx) and Date of Birth (mm/dd/yyyy) as indicated and click Submit. You will be taken to the next sign-up page. 5. Create a [employer] ID. This will be your [employer] login ID and cannot be changed, so think of one that is secure and easy to remember. 6. Create a [employer] password. You can change your password at any time. 7. Enter your Password Reset Question and Answer. This can be used at a later time if you forget your password. 8. Enter your e-mail address. You will receive e-mail notification when new information is available in [address] 9. Click Sign Up. You can now view and download portions of your medical record. 10. Click the Download Summary menu link to download a portable copy of your medical information. If you have questions, please visit the Frequently Asked Questions section of the [employer] website. Remember, [employer] is NOT to be used for urgent needs. For medical emergencies, dial 911. Now available from your iPhone and Android! Please provide this summary of care documentation to your next provider. Your primary care clinician is listed as Imani PERERA. If you have any questions after today's visit, please call [phone].

## 2018-06-30 RX ORDER — TRIAMCINOLONE ACETONIDE 5 MG/G
CREAM TOPICAL 2 TIMES DAILY
Qty: 15 G | Refills: 0 | Status: SHIPPED | OUTPATIENT
Start: 2018-06-30 | End: 2018-07-07

## 2018-06-30 NOTE — PROGRESS NOTES
Patient calls clinic informing that Topical steroid prescribed at recent Urgent Care visit is too expensive and not covered by insurance. Requesting alternative sent in. I have sent in an alternative new cream (triamcinolone). Patient to call for any concerns with cost. Should follow up with PCP/Keep plan per recent urgent care visit.

## 2018-07-01 NOTE — PROGRESS NOTES
Spoke with pt. Pt is aware that another prescription has been called in for her. Pt verbalized understanding.

## 2018-07-02 ENCOUNTER — OFFICE VISIT (OUTPATIENT)
Dept: INTERNAL MEDICINE CLINIC | Age: 73
End: 2018-07-02

## 2018-08-08 ENCOUNTER — HOSPITAL ENCOUNTER (OUTPATIENT)
Dept: CT IMAGING | Age: 73
Discharge: HOME | End: 2018-08-08
Attending: UROLOGY
Payer: MEDICARE

## 2018-08-08 DIAGNOSIS — K59.09 CHRONIC CONSTIPATION: ICD-10-CM

## 2018-08-08 LAB — CREAT BLD-MCNC: 0.7 MG/DL (ref 0.6–1.3)

## 2018-08-08 PROCEDURE — 74177 CT ABD & PELVIS W/CONTRAST: CPT

## 2018-08-08 PROCEDURE — 74011000255 HC RX REV CODE- 255: Performed by: UROLOGY

## 2018-08-08 PROCEDURE — 74011636320 HC RX REV CODE- 636/320: Performed by: UROLOGY

## 2018-08-08 PROCEDURE — 82565 ASSAY OF CREATININE: CPT

## 2018-08-08 PROCEDURE — 74011250636 HC RX REV CODE- 250/636: Performed by: UROLOGY

## 2018-08-08 RX ORDER — SODIUM CHLORIDE 0.9 % (FLUSH) 0.9 %
10 SYRINGE (ML) INJECTION
Status: COMPLETED | OUTPATIENT
Start: 2018-08-08 | End: 2018-08-08

## 2018-08-08 RX ORDER — BARIUM SULFATE 20 MG/ML
900 SUSPENSION ORAL
Status: COMPLETED | OUTPATIENT
Start: 2018-08-08 | End: 2018-08-08

## 2018-08-08 RX ORDER — SODIUM CHLORIDE 9 MG/ML
50 INJECTION, SOLUTION INTRAVENOUS
Status: COMPLETED | OUTPATIENT
Start: 2018-08-08 | End: 2018-08-08

## 2018-08-08 RX ADMIN — Medication 10 ML: at 10:19

## 2018-08-08 RX ADMIN — SODIUM CHLORIDE 50 ML/HR: 900 INJECTION, SOLUTION INTRAVENOUS at 10:19

## 2018-08-08 RX ADMIN — IOPAMIDOL 100 ML: 755 INJECTION, SOLUTION INTRAVENOUS at 10:18

## 2018-08-08 RX ADMIN — BARIUM SULFATE 900 ML: 21 SUSPENSION ORAL at 10:19

## 2018-09-21 ENCOUNTER — OFFICE VISIT (OUTPATIENT)
Dept: URGENT CARE | Age: 73
End: 2018-09-21

## 2018-09-21 VITALS
OXYGEN SATURATION: 99 % | RESPIRATION RATE: 18 BRPM | DIASTOLIC BLOOD PRESSURE: 52 MMHG | BODY MASS INDEX: 19.18 KG/M2 | SYSTOLIC BLOOD PRESSURE: 97 MMHG | WEIGHT: 97.7 LBS | HEIGHT: 60 IN | TEMPERATURE: 98 F | HEART RATE: 92 BPM

## 2018-09-21 DIAGNOSIS — K13.79 MOUTH SORE: ICD-10-CM

## 2018-09-21 DIAGNOSIS — K05.00 ACUTE GINGIVITIS: Primary | ICD-10-CM

## 2018-09-21 RX ORDER — TRAMADOL HYDROCHLORIDE 50 MG/1
50 TABLET ORAL
Qty: 30 TAB | Refills: 1 | Status: SHIPPED | OUTPATIENT
Start: 2018-09-21 | End: 2022-03-08 | Stop reason: ALTCHOICE

## 2018-09-21 RX ORDER — LIDOCAINE HYDROCHLORIDE 20 MG/ML
15 SOLUTION OROPHARYNGEAL
Qty: 1 BOTTLE | Refills: 0 | Status: SHIPPED | COMMUNITY
Start: 2018-09-21 | End: 2018-09-21

## 2018-09-21 NOTE — PROGRESS NOTES
Patient is a 75 y.o. female presenting with gum problem. The history is provided by the patient (3 week h/o acute upper gums and mouth sore pain, no h/o trauma, saw dentist within last 6 months, no issues. Pt. denies slurred speech, facial droop, numbness to one side of face or body. ). Gum Problem   This is a new problem. The current episode started more than 1 week ago. The problem has not changed since onset. Pertinent negatives include no chest pain, no abdominal pain and no shortness of breath. The symptoms are aggravated by eating. Nothing relieves the symptoms. She has tried nothing for the symptoms. The treatment provided no relief. Past Medical History:   Diagnosis Date    Asthma     Blood in stool 11/7/2013    Constipation 11/7/2013    Epigastric pain 11/7/2013    Occult blood positive stool 11/7/2013        Past Surgical History:   Procedure Laterality Date    COLONOSCOPY  3/19/08    HX HEENT Right     right eye cataract and glaucoma surgery         Family History   Problem Relation Age of Onset    Cancer Mother      liver cancer    Stroke Father     No Known Problems Sister     No Known Problems Brother     No Known Problems Brother         Social History     Social History    Marital status:      Spouse name: N/A    Number of children: N/A    Years of education: N/A     Occupational History    Not on file. Social History Main Topics    Smoking status: Never Smoker    Smokeless tobacco: Never Used    Alcohol use No    Drug use: Yes     Special: Prescription, OTC    Sexual activity: Yes     Partners: Male     Other Topics Concern    Not on file     Social History Narrative                ALLERGIES: Review of patient's allergies indicates no known allergies. Review of Systems   Constitutional: Negative for activity change, appetite change and chills. HENT: Positive for dental problem.  Negative for congestion, drooling, ear discharge, ear pain, facial swelling, hearing loss and mouth sores. Eyes: Negative for pain and redness. Respiratory: Negative for cough, choking and shortness of breath. Cardiovascular: Negative for chest pain, palpitations and leg swelling. Gastrointestinal: Negative for abdominal pain. Musculoskeletal: Negative for back pain. Skin: Negative for color change and pallor. Psychiatric/Behavioral: Negative for agitation and behavioral problems. Vitals:    09/21/18 1412   BP: 97/52   Pulse: 92   Resp: 18   Temp: 98 °F (36.7 °C)   SpO2: 99%   Weight: 97 lb 11.2 oz (44.3 kg)   Height: 5' (1.524 m)       Physical Exam   Constitutional: She is oriented to person, place, and time. She appears well-developed and well-nourished. No distress. HENT:   Head: Normocephalic and atraumatic. Right Ear: External ear normal.   Left Ear: External ear normal.   Pt. With gingival erythema at right upper molar and incisors, gingiva eroded to tooth at incisors, bucchal ulceration on right margin   Eyes: Conjunctivae and EOM are normal. Pupils are equal, round, and reactive to light. Right eye exhibits no discharge. No scleral icterus. Neck: Normal range of motion. Neck supple. No tracheal deviation present. No thyromegaly present. Cardiovascular: Normal rate, regular rhythm, normal heart sounds and intact distal pulses. Exam reveals no gallop and no friction rub. No murmur heard. Pulmonary/Chest: Effort normal and breath sounds normal. No respiratory distress. She has no wheezes. She has no rales. She exhibits no tenderness. Abdominal: Soft. Bowel sounds are normal. She exhibits no distension. There is no tenderness. There is no rebound. Musculoskeletal: Normal range of motion. She exhibits no edema or tenderness. Lymphadenopathy:     She has no cervical adenopathy. Neurological: She is alert and oriented to person, place, and time. No cranial nerve deficit. Coordination normal.   Skin: Skin is warm and dry. No rash noted.  She is not diaphoretic. No erythema. Psychiatric: She has a normal mood and affect. Her behavior is normal. Judgment and thought content normal.   Nursing note and vitals reviewed. MDM     Differential Diagnosis; Clinical Impression; Plan:     Gingivitis/ apthous ulcer- Glyoxide/oragel to gums/sore, f/u with dentist in 1-3 days, tramadol for pain meds.   Risk of Significant Complications, Morbidity, and/or Mortality:   Presenting problems:  Minimal  Diagnostic procedures:  Minimal  Management options:  Minimal  Progress:   Patient progress:  Stable      Procedures

## 2018-09-21 NOTE — MR AVS SNAPSHOT
[address] [facility] [address] 
[phone] Patient: Georgia Mullins MRN: [id number] Visit Information Date & Time Provider Department Dept. Phone Encounter #  
 9/21/2018  2:00 PM [address] [facility] [phone] [id number] Follow-up Instructions Return in about 1 week (around 9/28/2018), or if symptoms worsen or fail to improve. Routing History Your Appointments 2/4/2019 11:30 AM  
ROUTINE CARE with Clair Christina, [address] [place] [address],[address] [facility]) Appt Note: f/u  
 [address] [address]  
[address]  [address] [facility] [address] Upcoming Health Maintenance Date Due DTaP/Tdap/Td series (1 - Tdap) 7/7/1965 ZOSTER VACCINE AGE 60> 5/7/2004 BREAST CANCER SCRN MAMMOGRAM 3/24/2017 Influenza Age 5 to Adult 8/1/2018 GLAUCOMA SCREENING Q2Y 10/26/2018 MEDICARE YEARLY EXAM 2/6/2019 Allergies as of 9/21/2018  Review Complete On: 9/21/2018 By: ANUSHKA Sullivan No Known Allergies Current Immunizations  Reviewed on 10/31/2016 Name Date Influenza High Dose Vaccine PF 10/4/2016 Influenza Vaccine 9/10/2013 Influenza Vaccine Split 9/28/2011 ZZZ-RETIRED (DO NOT USE) Pneumococcal Vaccine (Unspecified Type) 2/24/2011 Zoster Recombinant 2/18/2018 Not reviewed this visit You Were Diagnosed With   
  
 Codes Comments Acute gingivitis    -  Primary ICD-10-CM: K05.00 ICD-9-CM: 523.00 Mouth sore     ICD-10-CM: K13.79 ICD-9-CM: 528.9 Vitals BP Pulse Temp Resp Height(growth percentile) Weight(growth percentile) 97/52 92 98 °F (36.7 °C) 18 5' (1.524 m) 97 lb 11.2 oz (44.3 kg) SpO2 BMI OB Status Smoking Status 99% 19.08 kg/m2 Postmenopausal Never Smoker BMI and BSA Data Body Mass Index Body Surface Area 19.08 kg/m 2 1.37 m 2 Preferred Pharmacy Pharmacy Name Phone [address] [place] [address] [phone] Your Updated Medication List  
  
   
This list is accurate as of 9/21/18  2:59 PM.  Always use your most recent med list.  
  
  
  
  
 CALCIUM 600 + D PO Take  by mouth.  
  
 lidocaine 2 % solution Commonly known as:  XYLOCAINE Take 15 mL by mouth now for 1 dose. Magnesium Oxide 500 mg Cap Take 300 mg by mouth. 1 tab a day  
  
 montelukast 10 mg tablet Commonly known as:  SINGULAIR TK 1 T PO IN THE MAGDIEL PULMICORT FLEXHALER 180 mcg/actuation Aepb inhaler Generic drug:  budesonide Take  by inhalation. traMADol 50 mg tablet Commonly known as:  ULTRAM  
Take 1 Tab by mouth every six (6) hours as needed for Pain. Max Daily Amount: 200 mg. Indications: Pain Prescriptions Printed Refills  
 traMADol (ULTRAM) 50 mg tablet 1 Sig: Take 1 Tab by mouth every six (6) hours as needed for Pain. Max Daily Amount: 200 mg. Indications: Pain Class: Print Route: Oral  
  
Follow-up Instructions Return in about 1 week (around 9/28/2018), or if symptoms worsen or fail to improve. Patient Instructions Periodontal Conditions: Care Instructions Your Care Instructions Periodontal conditions affect the gums, bone, and tissue that surround and support the teeth. The most common problems are caused by plaque. Plaque is a thin film of bacteria that sticks to teeth above and below the gum line. It can build up and harden into tartar. The bacteria in plaque and tartar can cause gum disease. Gingivitis is a disease that affects the gums (gingiva). The gums are the soft tissue that surrounds the teeth. Gingivitis causes red, swollen, tender gums that bleed easily when brushed, persistent bad breath, and sensitive teeth.  Because it is not painful, many people do not get treatment when they should. Gingivitis can be reversed with good dental care. Periodontitis is a more advanced disease that affects more than the gums. The gums pull away from the teeth. This leaves deep pockets where bacteria can grow. The disease can damage the bones that support the teeth. The teeth may get loose and fall out. A periodontal condition should be treated as soon as it is found. Finding gum problems early, treating them right away, and having regular checkups bring the best results. You can treat mild periodontal conditions by brushing and flossing your teeth every day. Your dentist may prescribe a mouthwash to kill the bacteria that can damage teeth and gums. Your dentist may have you take antibiotics to treat infection from moderate periodontal disease. If your gums have pulled away from your teeth, you may need cleaning between the teeth and gums right down to the teeth roots. This is called root planing and scaling. If you have severe periodontal disease, you may need surgery to remove diseased gum tissue or repair bone damage. Follow-up care is a key part of your treatment and safety. Be sure to make and go to all appointments, and call your dentist if you are having problems. It's also a good idea to know your test results and keep a list of the medicines you take. How can you care for yourself at home? · If your dentist prescribed antibiotics, take them as directed. Do not stop taking them just because you feel better. You need to take the full course of antibiotics. · Brush your teeth twice a day, in the morning and at night. ¨ Use a toothbrush with soft, rounded-end bristles and a head that is small enough to reach all parts of your teeth and mouth. Replace your toothbrush every 3 to 4 months. ¨ Use a fluoride toothpaste. ¨ Place the brush at a 45-degree angle where the teeth meet the gums. Press firmly, and gently rock the brush back and forth using small circular movements. ¨ Brush chewing surfaces vigorously with short back-and-forth strokes. ¨ Brush your tongue from back to front. · Floss at least once a day. Choose the type and flavor that you like best. 
· Have your teeth cleaned by a professional at least twice a year. · Ask your dentist about using an antibacterial mouthwash to help reduce bacteria. · Rinse your mouth with water or chew sugar-free gum after meals if you can't brush your teeth. · Do not smoke or use smokeless tobacco. Tobacco use can cause periodontal disease. When should you call for help? Call your dentist now or seek immediate medical care if: 
  · You have symptoms of infection, such as: 
¨ Increased pain, swelling, warmth, or redness. ¨ Red streaks leading from the area. ¨ Pus draining from the area. ¨ A fever.  
 Watch closely for changes in your health, and be sure to contact your dentist if: 
  · You have new or worse tooth pain.  
  · You do not get better as expected. Where can you learn more? Go to http://angelica-liam.info/. Enter S320 in the search box to learn more about \"Periodontal Conditions: Care Instructions. \" Current as of: May 12, 2017 Content Version: 11.7 © 6524-8979 [employer]. Care instructions adapted under license by [employer] (which disclaims liability or warranty for this information). If you have questions about a medical condition or this instruction, always ask your healthcare professional. [place] [address] any warranty or liability for your use of this information. Canker Sore: Care Instructions Your Care Instructions Canker sores are painful white sores in the mouth. They usually begin with a tingling feeling, followed by a red spot or bump that turns white. Canker sores appear most often on the tongue, inside the cheeks, and inside the lips. They can be very painful and can make talking, eating, and drinking difficult. A canker sore may form after an injury or stretching of tissues in the mouth, which can happen, for example, during a dental procedure or teeth cleaning. If you accidentally bite your tongue or the inside of your cheek, you may end up with a canker sore. Other possible causes are infection, certain foods, and stress. Canker sores are not contagious. The pain from your canker sore should decrease in 7 to 10 days, and it should heal completely in 1 to 3 weeks. In most cases, a canker sore will go away by itself. Home treatment can ease pain and discomfort. If you have a large or deep canker sore that does not seem to be getting better after 2 weeks, your doctor may prescribe medicine. Canker sores often come back again. Follow-up care is a key part of your treatment and safety. Be sure to make and go to all appointments, and call your doctor if you are having problems. It's also a good idea to know your test results and keep a list of the medicines you take. How can you care for yourself at home? · Drink cold liquids, such as water or iced tea, or eat flavored ice pops or frozen juices. Use a straw to keep the liquid from coming in contact with your canker sore. · Eat soft, bland foods that are easy to chew and swallow, such as ice cream, custard, applesauce, cottage cheese, macaroni and cheese, soft-cooked eggs, yogurt, or cream soups. · Cut foods into small pieces, or grind, mash, blend, or puree foods to make them easier to chew and swallow. · While your canker sore heals, avoid coffee, chocolate, spicy and salty foods, citrus fruits, nuts, seeds, and tomatoes. · To soothe your canker sore and help it heal: ¨ Use an over-the-counter numbing medicine, such as Orabase or Anbesol. ¨ Dab a bit of Milk of Magnesia on the canker sore 3 or 4 times a day. · Put ice on your sore to reduce the pain. · Take anti-inflammatory medicines to reduce pain, as needed.  These include ibuprofen (Advil, Motrin) and naproxen (Aleve). Read and follow all instructions on the label. · Use a soft-bristle toothbrush, and brush your teeth well but carefully. · Do not smoke or use spit tobacco. Tobacco can cause mouth problems and slow healing. If you need help quitting, talk to your doctor about stop-smoking programs and medicines. These can increase your chances of quitting for good. When should you call for help? Call your doctor now or seek immediate medical care if: 
  · You have signs of infection, such as: 
¨ Increased pain, swelling, warmth, or redness. ¨ Red streaks leading from the area. ¨ Pus draining from the area. ¨ A fever.  
 Watch closely for changes in your health, and be sure to contact your doctor if: 
  · You do not get better as expected. Where can you learn more? Go to http://angelica-liam.info/. Enter Y413 in the search box to learn more about \"Canker Sore: Care Instructions. \" Current as of: May 12, 2017 Content Version: 11.7 © 8502-0695 "[employer]". Care instructions adapted under license by [employer] (which disclaims liability or warranty for this information). If you have questions about a medical condition or this instruction, always ask your healthcare professional. [address] any warranty or liability for your use of this information. Introducing [facility]! Elif Rodriguez introduces [employer] patient portal. Now you can access parts of your medical record, email your doctor's office, and request medication refills online. 1. In your internet browser, go to [email or web address] 2. Click on the First Time User? Click Here link in the Sign In box. You will see the New Member Sign Up page. 3. Enter your [employer] Access Code exactly as it appears below. You will not need to use this code after youve completed the sign-up process.  If you do not sign up before the expiration date, you must request a new code. · ithinksport Access Code: [email or web address] Expires: 11/5/2018  2:51 PM 
 
4. Enter the last four digits of your Social Security Number (xxxx) and Date of Birth (mm/dd/yyyy) as indicated and click Submit. You will be taken to the next sign-up page. 5. Create a ithinksport ID. This will be your ithinksport login ID and cannot be changed, so think of one that is secure and easy to remember. 6. Create a ithinksport password. You can change your password at any time. 7. Enter your Password Reset Question and Answer. This can be used at a later time if you forget your password. 8. Enter your e-mail address. You will receive e-mail notification when new information is available in [address] 9. Click Sign Up. You can now view and download portions of your medical record. 10. Click the Download Summary menu link to download a portable copy of your medical information. If you have questions, please visit the Frequently Asked Questions section of the ithinksport website. Remember, ithinksport is NOT to be used for urgent needs. For medical emergencies, dial 911. Now available from your iPhone and Android! Please provide this summary of care documentation to your next provider. Your primary care clinician is listed as Meri PERERA. If you have any questions after today's visit, please call [phone].

## 2018-09-21 NOTE — PATIENT INSTRUCTIONS
Periodontal Conditions: Care Instructions  Your Care Instructions    Periodontal conditions affect the gums, bone, and tissue that surround and support the teeth. The most common problems are caused by plaque. Plaque is a thin film of bacteria that sticks to teeth above and below the gum line. It can build up and harden into tartar. The bacteria in plaque and tartar can cause gum disease. Gingivitis is a disease that affects the gums (gingiva). The gums are the soft tissue that surrounds the teeth. Gingivitis causes red, swollen, tender gums that bleed easily when brushed, persistent bad breath, and sensitive teeth. Because it is not painful, many people do not get treatment when they should. Gingivitis can be reversed with good dental care. Periodontitis is a more advanced disease that affects more than the gums. The gums pull away from the teeth. This leaves deep pockets where bacteria can grow. The disease can damage the bones that support the teeth. The teeth may get loose and fall out. A periodontal condition should be treated as soon as it is found. Finding gum problems early, treating them right away, and having regular checkups bring the best results. You can treat mild periodontal conditions by brushing and flossing your teeth every day. Your dentist may prescribe a mouthwash to kill the bacteria that can damage teeth and gums. Your dentist may have you take antibiotics to treat infection from moderate periodontal disease. If your gums have pulled away from your teeth, you may need cleaning between the teeth and gums right down to the teeth roots. This is called root planing and scaling. If you have severe periodontal disease, you may need surgery to remove diseased gum tissue or repair bone damage. Follow-up care is a key part of your treatment and safety. Be sure to make and go to all appointments, and call your dentist if you are having problems.  It's also a good idea to know your test results and keep a list of the medicines you take. How can you care for yourself at home? · If your dentist prescribed antibiotics, take them as directed. Do not stop taking them just because you feel better. You need to take the full course of antibiotics. · Brush your teeth twice a day, in the morning and at night. ¨ Use a toothbrush with soft, rounded-end bristles and a head that is small enough to reach all parts of your teeth and mouth. Replace your toothbrush every 3 to 4 months. ¨ Use a fluoride toothpaste. ¨ Place the brush at a 45-degree angle where the teeth meet the gums. Press firmly, and gently rock the brush back and forth using small circular movements. ¨ Brush chewing surfaces vigorously with short back-and-forth strokes. ¨ Brush your tongue from back to front. · Floss at least once a day. Choose the type and flavor that you like best.  · Have your teeth cleaned by a professional at least twice a year. · Ask your dentist about using an antibacterial mouthwash to help reduce bacteria. · Rinse your mouth with water or chew sugar-free gum after meals if you can't brush your teeth. · Do not smoke or use smokeless tobacco. Tobacco use can cause periodontal disease. When should you call for help? Call your dentist now or seek immediate medical care if:    · You have symptoms of infection, such as:  ¨ Increased pain, swelling, warmth, or redness. ¨ Red streaks leading from the area. ¨ Pus draining from the area. ¨ A fever.    Watch closely for changes in your health, and be sure to contact your dentist if:    · You have new or worse tooth pain.     · You do not get better as expected. Where can you learn more? Go to http://angelica-liam.info/. Enter Q244 in the search box to learn more about \"Periodontal Conditions: Care Instructions. \"  Current as of: May 12, 2017  Content Version: 11.7  © 3162-7161 [employer].  Care instructions adapted under license by [employer] (which disclaims liability or warranty for this information). If you have questions about a medical condition or this instruction, always ask your healthcare professional. [address] any warranty or liability for your use of this information. Canker Sore: Care Instructions  Your Care Instructions  Canker sores are painful white sores in the mouth. They usually begin with a tingling feeling, followed by a red spot or bump that turns white. Canker sores appear most often on the tongue, inside the cheeks, and inside the lips. They can be very painful and can make talking, eating, and drinking difficult. A canker sore may form after an injury or stretching of tissues in the mouth, which can happen, for example, during a dental procedure or teeth cleaning. If you accidentally bite your tongue or the inside of your cheek, you may end up with a canker sore. Other possible causes are infection, certain foods, and stress. Canker sores are not contagious. The pain from your canker sore should decrease in 7 to 10 days, and it should heal completely in 1 to 3 weeks. In most cases, a canker sore will go away by itself. Home treatment can ease pain and discomfort. If you have a large or deep canker sore that does not seem to be getting better after 2 weeks, your doctor may prescribe medicine. Canker sores often come back again. Follow-up care is a key part of your treatment and safety. Be sure to make and go to all appointments, and call your doctor if you are having problems. It's also a good idea to know your test results and keep a list of the medicines you take. How can you care for yourself at home? · Drink cold liquids, such as water or iced tea, or eat flavored ice pops or frozen juices. Use a straw to keep the liquid from coming in contact with your canker sore.   · Eat soft, bland foods that are easy to chew and swallow, such as ice cream, custard, applesauce, cottage cheese, macaroni and cheese, soft-cooked eggs, yogurt, or cream soups. · Cut foods into small pieces, or grind, mash, blend, or puree foods to make them easier to chew and swallow. · While your canker sore heals, avoid coffee, chocolate, spicy and salty foods, citrus fruits, nuts, seeds, and tomatoes. · To soothe your canker sore and help it heal:  ¨ Use an over-the-counter numbing medicine, such as Orabase or Anbesol. ¨ Dab a bit of Milk of Magnesia on the canker sore 3 or 4 times a day. · Put ice on your sore to reduce the pain. · Take anti-inflammatory medicines to reduce pain, as needed. These include ibuprofen (Advil, Motrin) and naproxen (Aleve). Read and follow all instructions on the label. · Use a soft-bristle toothbrush, and brush your teeth well but carefully. · Do not smoke or use spit tobacco. Tobacco can cause mouth problems and slow healing. If you need help quitting, talk to your doctor about stop-smoking programs and medicines. These can increase your chances of quitting for good. When should you call for help? Call your doctor now or seek immediate medical care if:    · You have signs of infection, such as:  ¨ Increased pain, swelling, warmth, or redness. ¨ Red streaks leading from the area. ¨ Pus draining from the area. ¨ A fever.    Watch closely for changes in your health, and be sure to contact your doctor if:    · You do not get better as expected. Where can you learn more? Go to http://angelica-liam.info/. Enter R359 in the search box to learn more about \"Canker Sore: Care Instructions. \"  Current as of: May 12, 2017  Content Version: 11.7  © 7203-6560 [employer]. Care instructions adapted under license by [employer] (which disclaims liability or warranty for this information).  If you have questions about a medical condition or this instruction, always ask your healthcare professional. [address]  any warranty or liability for your use of this information.

## 2019-02-03 PROBLEM — K80.20 GALLSTONES: Status: ACTIVE | Noted: 2019-02-03

## 2019-02-03 NOTE — PROGRESS NOTES
HISTORY OF PRESENT ILLNESS Avelino Rodrigues is a 75 y.o. female. HPI Pt here in f/u osteopenia HLD and fibromyalgia and medicare wellness-------- Had CT A/P for constipation last year--multiple gallstones==Dr Kirstin Lindsay On prolia for osteoporosis per rheum MD 
 
Lost weight down 7 lbs, walks 45 min to 1 hr every day , eating more per pt but some stress taking care of  who has dementia Denies any n/v abdominal pain f/c night sweats Never a smoker Not sleeping well C/o pain on scalp near vertex that radiates to right temple and scalp and face-sharp pain for months,c an last for hours C/o pain in upper to lower back area Has declined annual mammograms Had colonoscopy 2013-no polyps Saw  Dr Maxi Galdamez for IC with neg w/u so far--cysto and urethral dilation recommended Last OV Referred to oral surgeon for lesion on right buccal mucosa last visit Skelaxin prescribed for muscle spasms On prolia for osteoporosis T scores improving--osteopenia range-Dr. Lindsay 
  
  
Mammogram is due but pt declines mammogram again today 
  
  
  
Last year : 
Had left cataract glaucoma surgery since last vist 
Pt reports she has an appt in 2 months with Rheum MD Dr Cee Saleh for next prolia shot Not taking calcium with vit d now Walks 1 hr every day C/o pain in upper back area , worse at night. Request muscle relaxer for leg cramps Still has pains in her feet and legs-no change Walks 1 hour-almost every day 
singulair has been effective for allergy sxs per pt Not taking flexeril for muscle pain now No cp or sob sxs 
  
 
 
 
Patient Active Problem List  
 Diagnosis Date Noted  HLD (hyperlipidemia) 03/10/2014  Epigastric pain 11/07/2013  Blood in stool 11/07/2013  Constipation 11/07/2013  Occult blood positive stool 11/07/2013  Interstitial cystitis 02/24/2011  Asthma 02/12/2010  Fibromyalgia 02/12/2010  Osteoporosis 02/12/2010  Vitamin D deficiency 02/12/2010 Current Outpatient Medications Medication Sig Dispense Refill  traMADol (ULTRAM) 50 mg tablet Take 1 Tab by mouth every six (6) hours as needed for Pain. Max Daily Amount: 200 mg. Indications: Pain 30 Tab 1  
 budesonide (PULMICORT FLEXHALER) 180 mcg/actuation aepb inhaler Take  by inhalation.  montelukast (SINGULAIR) 10 mg tablet TK 1 T PO IN THE MAGDIEL  5  
 Magnesium Oxide 500 mg cap Take 300 mg by mouth. 1 tab a day  CALCIUM CARBONATE/VITAMIN D3 (CALCIUM 600 + D PO) Take  by mouth. No Known Allergies Lab Results Component Value Date/Time Glucose 93 07/31/2017 10:56 AM  
 LDL, calculated 153 (H) 07/31/2017 10:56 AM  
 Creatinine (POC) 0.7 08/08/2018 10:08 AM  
 Creatinine 0.61 07/31/2017 10:56 AM  
  
Lab Results Component Value Date/Time Cholesterol, total 263 (H) 07/31/2017 10:56 AM  
 HDL Cholesterol 93 07/31/2017 10:56 AM  
 LDL, calculated 153 (H) 07/31/2017 10:56 AM  
 Triglyceride 87 07/31/2017 10:56 AM  
 
Lab Results Component Value Date/Time GFR est non-AA 90 07/31/2017 10:56 AM  
 GFRNA, POC >60 08/08/2018 10:08 AM  
 GFR est  07/31/2017 10:56 AM  
 GFRAA, POC >60 08/08/2018 10:08 AM  
 Creatinine 0.61 07/31/2017 10:56 AM  
 Creatinine (POC) 0.7 08/08/2018 10:08 AM  
 BUN 13 07/31/2017 10:56 AM  
 Sodium 143 07/31/2017 10:56 AM  
 Potassium 5.2 07/31/2017 10:56 AM  
 Chloride 101 07/31/2017 10:56 AM  
 CO2 28 07/31/2017 10:56 AM  
 PTH, Intact 50.6 02/12/2010 11:15 AM  
  
ROS Physical Exam  
Constitutional: She appears well-developed and well-nourished. No distress. Appears stated age HENT:  
Some TTP near scalp vertex, no mass Cardiovascular: Normal rate, regular rhythm and normal heart sounds. Exam reveals no gallop and no friction rub. No murmur heard. Pulmonary/Chest: Effort normal and breath sounds normal. No respiratory distress. She has no wheezes. She has no rales. She exhibits no tenderness. Abdominal: Soft. Bowel sounds are normal. She exhibits no distension and no mass. There is no tenderness. There is no rebound and no guarding. Musculoskeletal: She exhibits no edema. No TTP of upper to lower back area Neurological: She is alert. Psychiatric: She has a normal mood and affect. Nursing note and vitals reviewed. ASSESSMENT and PLAN Diagnoses and all orders for this visit: 1. Pure hypercholesterolemia -     METABOLIC PANEL, COMPREHENSIVE 
-     LIPID PANEL 
-     TSH 3RD GENERATION 2. Osteoporosis, unspecified osteoporosis type, unspecified pathological fracture presence 
-     CBC W/O DIFF 
-     METABOLIC PANEL, COMPREHENSIVE 
-     VITAMIN D, 25 HYDROXY 3. Breast screening Follow-up Disposition: Not on File This is the Subsequent Medicare Annual Wellness Exam, performed 12 months or more after the Initial AWV or the last Subsequent AWV I have reviewed the patient's medical history in detail and updated the computerized patient record. History Past Medical History:  
Diagnosis Date  Asthma  Blood in stool 11/7/2013  Constipation 11/7/2013  Epigastric pain 11/7/2013  Occult blood positive stool 11/7/2013 Past Surgical History:  
Procedure Laterality Date  COLONOSCOPY  3/19/08  HX HEENT Right   
 right eye cataract and glaucoma surgery Current Outpatient Medications Medication Sig Dispense Refill  budesonide (PULMICORT FLEXHALER) 180 mcg/actuation aepb inhaler Take  by inhalation.  montelukast (SINGULAIR) 10 mg tablet TK 1 T PO IN THE MAGDIEL  5  
 Magnesium Oxide 500 mg cap Take 300 mg by mouth. 1 tab a day  CALCIUM CARBONATE/VITAMIN D3 (CALCIUM 600 + D PO) Take  by mouth.  traMADol (ULTRAM) 50 mg tablet Take 1 Tab by mouth every six (6) hours as needed for Pain. Max Daily Amount: 200 mg. Indications: Pain 30 Tab 1 No Known Allergies Family History Problem Relation Age of Onset  Cancer Mother liver cancer  Stroke Father  No Known Problems Sister  No Known Problems Brother  No Known Problems Brother Social History Tobacco Use  Smoking status: Never Smoker  Smokeless tobacco: Never Used Substance Use Topics  Alcohol use: No  
 
Patient Active Problem List  
Diagnosis Code  Asthma J45.909  Fibromyalgia M79.7  Osteoporosis M81.0  Vitamin D deficiency E55.9  Interstitial cystitis N30.10  Epigastric pain R10.13  Blood in stool K92.1  Constipation K59.00  
 Occult blood positive stool R19.5  HLD (hyperlipidemia) E78.5  Gallstones K80.20 Depression Risk Factor Screening: PHQ over the last two weeks 2/4/2019 Little interest or pleasure in doing things Not at all Feeling down, depressed, irritable, or hopeless Not at all Total Score PHQ 2 0 Alcohol Risk Factor Screening: You do not drink alcohol or very rarely. Functional Ability and Level of Safety:  
Hearing Loss Hearing is good. The patient needs further evaluation. Activities of Daily Living The home contains: handrails Patient does total self care Fall Risk Fall Risk Assessment, last 12 mths 2/4/2019 Able to walk? Yes Fall in past 12 months? No  
Fall with injury? -  
Number of falls in past 12 months - Fall Risk Score -  
 
 
Abuse Screen Patient is not abused Cognitive Screening Evaluation of Cognitive Function: 
Has your family/caregiver stated any concerns about your memory: no 
Normal 
 
Patient Care Team  
Patient Care Team: 
Veronica Larkin MD as PCP - Airam Manzano MD (Gastroenterology) Mark Schumacher MD (Urology) Mary Ann Calabrese MD (Otolaryngology) Matthew Lainez MD (Gynecology) Harpreet Washburn MD (Ophthalmology) Jaycob Burris RN as Nurse Navigator Nish Mendoza MD as Physician (Internal Medicine) Assessment/Plan Education and counseling provided: 
Are appropriate based on today's review and evaluation Screening Mammography 
tdap and shingrix recommended Diagnoses and all orders for this visit: 1. Pure hypercholesterolemia -     METABOLIC PANEL, COMPREHENSIVE 
-     LIPID PANEL 
-     TSH 3RD GENERATION 2. Osteoporosis, unspecified osteoporosis type, unspecified pathological fracture presence 
-     CBC W/O DIFF 
-     METABOLIC PANEL, COMPREHENSIVE 
-     VITAMIN D, 25 HYDROXY 3. Breast screening Mammogram ordered 4. Weight loss F/u labs Recommended mammogram again Advised to increase calories in diet 5. Anxiety Restart cymbalta 30 mg qd 6. Fibromyalgia Restart cymbalta 30 mg qd Health Maintenance Due Topic Date Due  
 DTaP/Tdap/Td series (1 - Tdap) 07/07/1965  BREAST CANCER SCRN MAMMOGRAM  03/24/2017  Shingrix Vaccine Age 50> (2 of 2) 04/15/2018  Influenza Age 5 to Adult  08/01/2018  GLAUCOMA SCREENING Q2Y  10/26/2018

## 2019-02-04 ENCOUNTER — HOSPITAL ENCOUNTER (OUTPATIENT)
Dept: LAB | Age: 74
Discharge: HOME | End: 2019-02-04
Payer: MEDICARE

## 2019-02-04 ENCOUNTER — OFFICE VISIT (OUTPATIENT)
Dept: INTERNAL MEDICINE CLINIC | Age: 74
End: 2019-02-04

## 2019-02-04 VITALS
HEART RATE: 82 BPM | WEIGHT: 90 LBS | BODY MASS INDEX: 17.67 KG/M2 | DIASTOLIC BLOOD PRESSURE: 69 MMHG | TEMPERATURE: 98.1 F | HEIGHT: 60 IN | SYSTOLIC BLOOD PRESSURE: 112 MMHG | OXYGEN SATURATION: 97 %

## 2019-02-04 DIAGNOSIS — M79.7 FIBROMYALGIA: ICD-10-CM

## 2019-02-04 DIAGNOSIS — M81.0 OSTEOPOROSIS, UNSPECIFIED OSTEOPOROSIS TYPE, UNSPECIFIED PATHOLOGICAL FRACTURE PRESENCE: ICD-10-CM

## 2019-02-04 DIAGNOSIS — F41.9 ANXIETY: ICD-10-CM

## 2019-02-04 DIAGNOSIS — R51.9 CHRONIC RIGHT-SIDED HEADACHES: ICD-10-CM

## 2019-02-04 DIAGNOSIS — E78.00 PURE HYPERCHOLESTEROLEMIA: Primary | ICD-10-CM

## 2019-02-04 DIAGNOSIS — Z12.39 BREAST SCREENING: ICD-10-CM

## 2019-02-04 DIAGNOSIS — G89.29 CHRONIC RIGHT-SIDED HEADACHES: ICD-10-CM

## 2019-02-04 PROCEDURE — 82306 VITAMIN D 25 HYDROXY: CPT

## 2019-02-04 PROCEDURE — 85027 COMPLETE CBC AUTOMATED: CPT

## 2019-02-04 PROCEDURE — 80061 LIPID PANEL: CPT

## 2019-02-04 PROCEDURE — 80053 COMPREHEN METABOLIC PANEL: CPT

## 2019-02-04 PROCEDURE — 36415 COLL VENOUS BLD VENIPUNCTURE: CPT

## 2019-02-04 PROCEDURE — 84443 ASSAY THYROID STIM HORMONE: CPT

## 2019-02-04 RX ORDER — DULOXETIN HYDROCHLORIDE 30 MG/1
30 CAPSULE, DELAYED RELEASE ORAL DAILY
Qty: 30 CAP | Refills: 3 | Status: SHIPPED | OUTPATIENT
Start: 2019-02-04 | End: 2019-02-04 | Stop reason: SDUPTHER

## 2019-02-04 RX ORDER — DULOXETIN HYDROCHLORIDE 30 MG/1
30 CAPSULE, DELAYED RELEASE ORAL DAILY
Qty: 30 CAP | Refills: 3 | Status: SHIPPED | OUTPATIENT
Start: 2019-02-04 | End: 2019-05-06

## 2019-02-04 NOTE — PATIENT INSTRUCTIONS
Medicare Wellness Visit, Female The best way to live healthy is to have a lifestyle where you eat a well-balanced diet, exercise regularly, limit alcohol use, and quit all forms of tobacco/nicotine, if applicable. Regular preventive services are another way to keep healthy. Preventive services (vaccines, screening tests, monitoring & exams) can help personalize your care plan, which helps you manage your own care. Screening tests can find health problems at the earliest stages, when they are easiest to treat. [facility] follows the current, evidence-based guidelines published by the [facility] Rafi Vin ([facility]) when recommending preventive services for our patients. Because we follow these guidelines, sometimes recommendations change over time as research supports it. (For example, mammograms used to be recommended annually. Even though Medicare will still pay for an annual mammogram, the newer guidelines recommend a mammogram every two years for women of average risk.) Of course, you and your doctor may decide to screen more often for some diseases, based on your risk and your health status. Preventive services for you include: - Medicare offers their members a free annual wellness visit, which is time for you and your primary care provider to discuss and plan for your preventive service needs. Take advantage of this benefit every year! 
-All adults over the age of 72 should receive the recommended pneumonia vaccines. Current USPSTF guidelines recommend a series of two vaccines for the best pneumonia protection.  
-All adults should have a flu vaccine yearly and a tetanus vaccine every 10 years. All adults age 61 and older should receive a shingles vaccine once in their lifetime.   
-A bone mass density test is recommended when a woman turns 65 to screen for osteoporosis. This test is only recommended one time, as a screening. Some providers will use this same test as a disease monitoring tool if you already have osteoporosis. -All adults age 38-68 who are overweight should have a diabetes screening test once every three years.  
-Other screening tests and preventive services for persons with diabetes include: an eye exam to screen for diabetic retinopathy, a kidney function test, a foot exam, and stricter control over your cholesterol.  
-Cardiovascular screening for adults with routine risk involves an electrocardiogram (ECG) at intervals determined by your doctor.  
-Colorectal cancer screenings should be done for adults age 54-65 with no increased risk factors for colorectal cancer. There are a number of acceptable methods of screening for this type of cancer. Each test has its own benefits and drawbacks. Discuss with your doctor what is most appropriate for you during your annual wellness visit. The different tests include: colonoscopy (considered the best screening method), a fecal occult blood test, a fecal DNA test, and sigmoidoscopy. -Breast cancer screenings are recommended every other year for women of normal risk, age 54-69. 
-Cervical cancer screenings for women over age 72 are only recommended with certain risk factors.  
-All adults born between [facility] should be screened once for Hepatitis C. Here is a list of your current Health Maintenance items (your personalized list of preventive services) with a due date: 
Health Maintenance Due Topic Date Due  
 DTaP/Tdap/Td  (1 - Tdap) 07/07/1965  Breast Cancer Screening  03/24/2017  Shingles Vaccine (2 of 2) 04/15/2018  Flu Vaccine  08/01/2018  Glaucoma Screening   10/26/2018

## 2019-02-04 NOTE — PROGRESS NOTES
Chief Complaint Patient presents with  Labs 6 month follow up  Back Pain  
  upper part to buttocks  Cough 2 days  Head Pain  
  top of head  . sore to the touch

## 2019-02-06 LAB
25(OH)D3+25(OH)D2 SERPL-MCNC: 46.9 NG/ML (ref 30–100)
ALBUMIN SERPL-MCNC: 4.5 G/DL (ref 3.5–4.8)
ALBUMIN/GLOB SERPL: 1.4 {RATIO} (ref 1.2–2.2)
ALP SERPL-CCNC: 42 IU/L (ref 39–117)
ALT SERPL-CCNC: 14 IU/L (ref 0–32)
AST SERPL-CCNC: 26 IU/L (ref 0–40)
BILIRUB SERPL-MCNC: 0.2 MG/DL (ref 0–1.2)
BUN SERPL-MCNC: 14 MG/DL (ref 8–27)
BUN/CREAT SERPL: 18 (ref 12–28)
CALCIUM SERPL-MCNC: 9.3 MG/DL (ref 8.7–10.3)
CHLORIDE SERPL-SCNC: 106 MMOL/L (ref 96–106)
CHOLEST SERPL-MCNC: 222 MG/DL (ref 100–199)
CO2 SERPL-SCNC: 19 MMOL/L (ref 20–29)
CREAT SERPL-MCNC: 0.76 MG/DL (ref 0.57–1)
ERYTHROCYTE [DISTWIDTH] IN BLOOD BY AUTOMATED COUNT: 13.2 % (ref 12.3–15.4)
GLOBULIN SER CALC-MCNC: 3.3 G/DL (ref 1.5–4.5)
GLUCOSE SERPL-MCNC: 78 MG/DL (ref 65–99)
HCT VFR BLD AUTO: 38.5 % (ref 34–46.6)
HDLC SERPL-MCNC: 77 MG/DL
HGB BLD-MCNC: 12.5 G/DL (ref 11.1–15.9)
LDLC SERPL CALC-MCNC: 124 MG/DL (ref 0–99)
MCH RBC QN AUTO: 31 PG (ref 26.6–33)
MCHC RBC AUTO-ENTMCNC: 32.5 G/DL (ref 31.5–35.7)
MCV RBC AUTO: 96 FL (ref 79–97)
PLATELET # BLD AUTO: 220 X10E3/UL (ref 150–379)
POTASSIUM SERPL-SCNC: 4.4 MMOL/L (ref 3.5–5.2)
PROT SERPL-MCNC: 7.8 G/DL (ref 6–8.5)
RBC # BLD AUTO: 4.03 X10E6/UL (ref 3.77–5.28)
SODIUM SERPL-SCNC: 146 MMOL/L (ref 134–144)
TRIGL SERPL-MCNC: 104 MG/DL (ref 0–149)
TSH SERPL DL<=0.005 MIU/L-ACNC: 2.73 UIU/ML (ref 0.45–4.5)
VLDLC SERPL CALC-MCNC: 21 MG/DL (ref 5–40)
WBC # BLD AUTO: 4.6 X10E3/UL (ref 3.4–10.8)

## 2019-02-14 ENCOUNTER — TELEPHONE (OUTPATIENT)
Dept: INTERNAL MEDICINE CLINIC | Age: 74
End: 2019-02-14

## 2019-02-14 ENCOUNTER — HOSPITAL ENCOUNTER (OUTPATIENT)
Dept: CT IMAGING | Age: 74
Discharge: HOME | End: 2019-02-14
Attending: INTERNAL MEDICINE
Payer: MEDICARE

## 2019-02-14 DIAGNOSIS — R51.9 CHRONIC RIGHT-SIDED HEADACHES: ICD-10-CM

## 2019-02-14 DIAGNOSIS — G89.29 CHRONIC RIGHT-SIDED HEADACHES: ICD-10-CM

## 2019-02-14 PROCEDURE — 70450 CT HEAD/BRAIN W/O DYE: CPT

## 2019-02-14 RX ORDER — NAPROXEN 375 MG/1
375 TABLET ORAL
Qty: 30 TAB | Refills: 1 | Status: SHIPPED | OUTPATIENT
Start: 2019-02-14 | End: 2021-05-06

## 2019-02-14 NOTE — TELEPHONE ENCOUNTER
Patient is requesting a different medication other than Duloxetine and Tramadol for her neck and back pain. Both  medications not working per patient.

## 2019-02-14 NOTE — PROGRESS NOTES
Tell  Pt her head CT is negative --headaches  Offer referral to PT for neck and back pain .  I will escribe naprosyn she can take prn for pain

## 2019-02-15 ENCOUNTER — TELEPHONE (OUTPATIENT)
Dept: INTERNAL MEDICINE CLINIC | Age: 74
End: 2019-02-15

## 2019-02-15 NOTE — TELEPHONE ENCOUNTER
----- Message from Humberto Landa MD sent at 2/14/2019  4:49 PM EST -----  Tell  Pt her head CT is negative --headaches  Offer referral to PT for neck and back pain .  I will escribe naprosyn she can take prn for pain

## 2019-02-15 NOTE — TELEPHONE ENCOUNTER
Patient identified using 2 identifiers name and date of birth. Patient was informed and advised per Dr. Leroy Perez orders. Patient verbalized understanding.

## 2019-05-04 NOTE — PROGRESS NOTES
HISTORY OF PRESENT ILLNESS Cheryl Kelley is a 75 y.o. female. HPI Pt here in f/u osteoporosis HLD and fibromyalgia and weight loss Weight down to 1 lb since last OV but eats 3 meals per day Was restarted on cymbalta for fibromyalgia -pain in back and hip area but stopped the medication Tool alleve which did not help the pain Due for pcv 13 and mammograms. She adamantly refuses to get another mammogram 
Has not noted any breast changes She has pain all over but primarily from upper back to buttocks area which has been chronic for years but worse last 3 months Still walks for exercise Stressed d/t husbands progressive memory loss C/o headaches and pain and ? Swelling of the scalpx 3 months--head CT was done--no acute process C/o sleeping on 3-4 hrs Feels unhappy all the time now Last  Last OV Had CT A/P for constipation last year--multiple gallstones==Dr Kirstin Lindsay On prolia for osteoporosis per rheum MD 
  
Lost weight down 7 lbs, walks 45 min to 1 hr every day , eating more per pt but some stress taking care of  who has dementia Denies any n/v abdominal pain f/c night sweats Never a smoker Not sleeping well 
  
C/o pain on scalp near vertex that radiates to right temple and scalp and face-sharp pain for months,c an last for hours C/o pain in upper to lower back area 
  
Has declined annual mammograms Had colonoscopy 2013-no polyps 
  Saw  Dr Frederic Valero for IC with neg w/u so far--cysto and urethral dilation recommended Last OV Referred to oral surgeon for lesion on right buccal mucosa last visit Skelaxin prescribed for muscle spasms On prolia for osteoporosis T scores improving--osteopenia range-Dr. Lindsay 
  
  
Mammogram is due but pt declines mammogram again today 
  
 
 
 
Patient Active Problem List  
 Diagnosis Date Noted  Gallstones 02/03/2019  HLD (hyperlipidemia) 03/10/2014  Epigastric pain 11/07/2013  Blood in stool 11/07/2013  Constipation 11/07/2013  Occult blood positive stool 11/07/2013  Interstitial cystitis 02/24/2011  Asthma 02/12/2010  Fibromyalgia 02/12/2010  Osteoporosis 02/12/2010  Vitamin D deficiency 02/12/2010 Current Outpatient Medications Medication Sig Dispense Refill  naproxen (NAPROSYN) 375 mg tablet Take 1 Tab by mouth two (2) times daily as needed. For neck or back pain 30 Tab 1  DULoxetine (CYMBALTA) 30 mg capsule Take 1 Cap by mouth daily. 30 Cap 3  
 traMADol (ULTRAM) 50 mg tablet Take 1 Tab by mouth every six (6) hours as needed for Pain. Max Daily Amount: 200 mg. Indications: Pain 30 Tab 1  
 budesonide (PULMICORT FLEXHALER) 180 mcg/actuation aepb inhaler Take  by inhalation.  montelukast (SINGULAIR) 10 mg tablet TK 1 T PO IN THE MAGDIEL  5  
 Magnesium Oxide 500 mg cap Take 300 mg by mouth. 1 tab a day  CALCIUM CARBONATE/VITAMIN D3 (CALCIUM 600 + D PO) Take  by mouth. No Known Allergies Lab Results Component Value Date/Time WBC 4.6 02/04/2019 02:33 PM  
 HGB 12.5 02/04/2019 02:33 PM  
 HCT 38.5 02/04/2019 02:33 PM  
 PLATELET 880 13/38/6236 02:33 PM  
 MCV 96 02/04/2019 02:33 PM  
 
Lab Results Component Value Date/Time Glucose 78 02/04/2019 02:33 PM  
 LDL, calculated 124 (H) 02/04/2019 02:33 PM  
 Creatinine (POC) 0.7 08/08/2018 10:08 AM  
 Creatinine 0.76 02/04/2019 02:33 PM  
  
Lab Results Component Value Date/Time Cholesterol, total 222 (H) 02/04/2019 02:33 PM  
 HDL Cholesterol 77 02/04/2019 02:33 PM  
 LDL, calculated 124 (H) 02/04/2019 02:33 PM  
 Triglyceride 104 02/04/2019 02:33 PM  
 
Lab Results Component Value Date/Time ALT (SGPT) 14 02/04/2019 02:33 PM  
 AST (SGOT) 26 02/04/2019 02:33 PM  
 Alk. phosphatase 42 02/04/2019 02:33 PM  
 Bilirubin, total 0.2 02/04/2019 02:33 PM  
 Albumin 4.5 02/04/2019 02:33 PM  
 Protein, total 7.8 02/04/2019 02:33 PM  
 PLATELET 486 32/16/1644 02:33 PM  
 
Lab Results Component Value Date/Time GFR est non-AA 78 02/04/2019 02:33 PM  
 GFRNA, POC >60 08/08/2018 10:08 AM  
 GFR est AA 89 02/04/2019 02:33 PM  
 GFRAA, POC >60 08/08/2018 10:08 AM  
 Creatinine 0.76 02/04/2019 02:33 PM  
 Creatinine (POC) 0.7 08/08/2018 10:08 AM  
 BUN 14 02/04/2019 02:33 PM  
 Sodium 146 (H) 02/04/2019 02:33 PM  
 Potassium 4.4 02/04/2019 02:33 PM  
 Chloride 106 02/04/2019 02:33 PM  
 CO2 19 (L) 02/04/2019 02:33 PM  
 PTH, Intact 50.6 02/12/2010 11:15 AM  
  
ROS Physical Exam  
Constitutional: She appears well-developed and well-nourished. Appears stated age Cardiovascular: Normal rate, regular rhythm and normal heart sounds. Exam reveals no gallop and no friction rub. No murmur heard. Pulmonary/Chest: Effort normal and breath sounds normal. No respiratory distress. She has no wheezes. She has no rales. She exhibits no tenderness. Abdominal: Soft. Bowel sounds are normal. She exhibits no distension and no mass. There is no tenderness. There is no rebound and no guarding. Musculoskeletal: She exhibits no edema. Normal neck and b/l shoulder ROM Mild TTP of back , upper chest , thighs Neurological: She is alert. Psychiatric: She has a normal mood and affect. Nursing note and vitals reviewed. ASSESSMENT and PLAN Diagnoses and all orders for this visit: 1. Fibromyalgia Advised pt to f/u with [facility] not only for osteoporosis but for fibromyalgia again Had had imaging of back and spine but can consider repeating  
 heatr tabby, strectching etc 
 Trial of remeron 7.5 mg hs for  Insomnia, depression and weight loss Had had tramadol and savella in the past 
2. Encounter for immunization 
-     PNEUMOCOCCAL CONJ VACCINE 13 VALENT IM 
 
3. Weight loss As per above 4. Primary insomnia Try remeron 5. Depression, unspecified depression type 
 remeron rx 6. Screening Advised annual mammogram again Other orders 
-     mirtazapine (REMERON) 7.5 mg tablet; Take 1 Tab by mouth nightly. Follow-up and Dispositions · Return in about 2 months (around 7/6/2019) for insomnia fibromyalgia.

## 2019-05-06 ENCOUNTER — OFFICE VISIT (OUTPATIENT)
Dept: INTERNAL MEDICINE CLINIC | Age: 74
End: 2019-05-06

## 2019-05-06 VITALS
BODY MASS INDEX: 17.47 KG/M2 | RESPIRATION RATE: 16 BRPM | SYSTOLIC BLOOD PRESSURE: 125 MMHG | OXYGEN SATURATION: 98 % | WEIGHT: 89 LBS | HEIGHT: 60 IN | HEART RATE: 75 BPM | TEMPERATURE: 97.4 F | DIASTOLIC BLOOD PRESSURE: 68 MMHG

## 2019-05-06 DIAGNOSIS — Z23 ENCOUNTER FOR IMMUNIZATION: ICD-10-CM

## 2019-05-06 DIAGNOSIS — F32.A DEPRESSION, UNSPECIFIED DEPRESSION TYPE: ICD-10-CM

## 2019-05-06 DIAGNOSIS — M79.7 FIBROMYALGIA: Primary | ICD-10-CM

## 2019-05-06 DIAGNOSIS — R63.4 WEIGHT LOSS: ICD-10-CM

## 2019-05-06 DIAGNOSIS — F51.01 PRIMARY INSOMNIA: ICD-10-CM

## 2019-05-06 RX ORDER — MIRTAZAPINE 7.5 MG/1
7.5 TABLET, FILM COATED ORAL
Qty: 30 TAB | Refills: 1 | Status: SHIPPED | OUTPATIENT
Start: 2019-05-06 | End: 2019-05-14

## 2019-05-06 RX ORDER — MIRTAZAPINE 7.5 MG/1
7.5 TABLET, FILM COATED ORAL
Qty: 30 TAB | Refills: 1 | Status: SHIPPED | OUTPATIENT
Start: 2019-05-06 | End: 2019-05-06 | Stop reason: SDUPTHER

## 2019-05-06 NOTE — PROGRESS NOTES
Chief Complaint Patient presents with  Weight Loss 3 month follow up  Fibromyalgia  
  3 month follow up Deanna Blanton Annual Wellness Visit Annual  
 Generalized Body Aches Whole body,  fever in body per patient  Headache  Insomnia  
  due to pain worse at night

## 2019-05-14 RX ORDER — MIRTAZAPINE 7.5 MG/1
7.5 TABLET, FILM COATED ORAL
Qty: 90 TAB | Refills: 1 | Status: SHIPPED | OUTPATIENT
Start: 2019-05-14

## 2019-05-14 NOTE — TELEPHONE ENCOUNTER
Requested Prescriptions     Pending Prescriptions Disp Refills    mirtazapine (REMERON) 7.5 mg tablet 90 Tab 1     Sig: Take 1 Tab by mouth nightly. Indications: Disorder characterized by Stiff, Tender & Painful Muscles     PCP: Jacquelyn Murrell MD    Last appt: 5/6/2019  Future Appointments   Date Time Provider Tien Genaoi   7/9/2019 11:30 AM MD Lisbeth Monk 86       PCP: Jacquelyn Murrell MD    Last appt: 5/6/2019  Future Appointments   Date Time Provider Tien Genaoi   7/9/2019 11:30 AM MD Lisbeth Monk 86       Requested Prescriptions     Pending Prescriptions Disp Refills    mirtazapine (REMERON) 7.5 mg tablet 90 Tab 1     Sig: Take 1 Tab by mouth nightly.  Indications: Disorder characterized by Stiff, Tender & Painful Muscles

## 2019-07-09 ENCOUNTER — OFFICE VISIT (OUTPATIENT)
Dept: INTERNAL MEDICINE CLINIC | Age: 74
End: 2019-07-09

## 2019-07-09 VITALS
BODY MASS INDEX: 17.28 KG/M2 | DIASTOLIC BLOOD PRESSURE: 67 MMHG | SYSTOLIC BLOOD PRESSURE: 114 MMHG | HEART RATE: 85 BPM | HEIGHT: 60 IN | OXYGEN SATURATION: 97 % | RESPIRATION RATE: 16 BRPM | TEMPERATURE: 98.1 F | WEIGHT: 88 LBS

## 2019-07-09 DIAGNOSIS — M54.42 CHRONIC LOW BACK PAIN WITH BILATERAL SCIATICA, UNSPECIFIED BACK PAIN LATERALITY: ICD-10-CM

## 2019-07-09 DIAGNOSIS — G89.29 CHRONIC LOW BACK PAIN WITH BILATERAL SCIATICA, UNSPECIFIED BACK PAIN LATERALITY: ICD-10-CM

## 2019-07-09 DIAGNOSIS — M54.2 NECK PAIN: Primary | ICD-10-CM

## 2019-07-09 DIAGNOSIS — F51.01 PRIMARY INSOMNIA: ICD-10-CM

## 2019-07-09 DIAGNOSIS — M54.9 UPPER BACK PAIN: ICD-10-CM

## 2019-07-09 DIAGNOSIS — M54.41 CHRONIC LOW BACK PAIN WITH BILATERAL SCIATICA, UNSPECIFIED BACK PAIN LATERALITY: ICD-10-CM

## 2019-07-09 RX ORDER — ZOLPIDEM TARTRATE 5 MG/1
5 TABLET ORAL
Qty: 30 TAB | Refills: 1 | Status: SHIPPED | OUTPATIENT
Start: 2019-07-09 | End: 2019-12-04 | Stop reason: SDUPTHER

## 2019-07-09 NOTE — PROGRESS NOTES
Chief Complaint   Patient presents with    Insomnia     2 month follow up    Fibromyalgia     2 month follow up/  pain whole body    Dizziness     unsteady gait

## 2019-07-09 NOTE — PROGRESS NOTES
HISTORY OF PRESENT ILLNESS  Charles Montilla is a 75 y.o. female. HPI    f/u fibromyalgia, weight loss and insomnia  Took remeron for 1 month-only helped slightly with sleep but still only sleeping 3-4 hrs   Awakes due to pain in upper back  Has pain that is worse at night  Pain is sharp and radiates to arms  Also has low back pain and legs pain but not as severe  ES tylenol not helping the pain    Above pain has been present for years but upper back pain is getting worse per pt    Weight down 1 lb-consumes 3 meals per day and appetite ok per pt    Last OV  Pt here in f/u osteoporosis HLD and fibromyalgia and weight loss   Weight down to 1 lb since last OV but eats 3 meals per day  Was restarted on cymbalta for fibromyalgia -pain in back and hip area but stopped the medication  Tool alleve which did not help the pain  Due for pcv 13 and mammograms. She adamantly refuses to get another mammogram  Has not noted any breast changes  She has pain all over but primarily from upper back to buttocks area which has been chronic for years but worse last 3 months  Still walks for exercise  Stressed d/t husbands progressive memory loss  C/o headaches and pain and ? Swelling of the scalpx 3 months--head CT was done--no acute process  C/o sleeping on 3-4 hrs  Feels unhappy all the time now  Last            Patient Active Problem List    Diagnosis Date Noted    Gallstones 02/03/2019    HLD (hyperlipidemia) 03/10/2014    Epigastric pain 11/07/2013    Blood in stool 11/07/2013    Constipation 11/07/2013    Occult blood positive stool 11/07/2013    Interstitial cystitis 02/24/2011    Asthma 02/12/2010    Fibromyalgia 02/12/2010    Osteoporosis 02/12/2010    Vitamin D deficiency 02/12/2010     Current Outpatient Medications   Medication Sig Dispense Refill    calcium glycerophosphate (PRELIEF PO) Take  by mouth.  zolpidem (AMBIEN) 5 mg tablet Take 1 Tab by mouth nightly as needed for Sleep. Max Daily Amount: 5 mg. 30 Tab 1    mirtazapine (REMERON) 7.5 mg tablet Take 1 Tab by mouth nightly. Indications: Disorder characterized by Stiff, Tender & Painful Muscles 90 Tab 1    vit A,C,E-zinc-copper (ICAPS AREDS) cap capsule Take 1 Cap by mouth.  budesonide (PULMICORT FLEXHALER) 180 mcg/actuation aepb inhaler Take  by inhalation.  montelukast (SINGULAIR) 10 mg tablet TK 1 T PO IN THE MAGDIEL  5    Magnesium Oxide 500 mg cap Take 300 mg by mouth. 1 tab a day      CALCIUM CARBONATE/VITAMIN D3 (CALCIUM 600 + D PO) Take  by mouth.  naproxen (NAPROSYN) 375 mg tablet Take 1 Tab by mouth two (2) times daily as needed. For neck or back pain 30 Tab 1    traMADol (ULTRAM) 50 mg tablet Take 1 Tab by mouth every six (6) hours as needed for Pain. Max Daily Amount: 200 mg. Indications: Pain 30 Tab 1     No Known Allergies   Lab Results   Component Value Date/Time    WBC 4.6 02/04/2019 02:33 PM    HGB 12.5 02/04/2019 02:33 PM    HCT 38.5 02/04/2019 02:33 PM    PLATELET 371 06/09/7688 02:33 PM    MCV 96 02/04/2019 02:33 PM     Lab Results   Component Value Date/Time    GFR est non-AA 78 02/04/2019 02:33 PM    GFRNA, POC >60 08/08/2018 10:08 AM    GFR est AA 89 02/04/2019 02:33 PM    GFRAA, POC >60 08/08/2018 10:08 AM    Creatinine 0.76 02/04/2019 02:33 PM    Creatinine (POC) 0.7 08/08/2018 10:08 AM    BUN 14 02/04/2019 02:33 PM    Sodium 146 (H) 02/04/2019 02:33 PM    Potassium 4.4 02/04/2019 02:33 PM    Chloride 106 02/04/2019 02:33 PM    CO2 19 (L) 02/04/2019 02:33 PM    PTH, Intact 50.6 02/12/2010 11:15 AM        ROS    Physical Exam   Constitutional: She appears well-developed and well-nourished. Appears stated age   HENT:   Mouth/Throat: Oropharynx is clear and moist.   Eyes: Pupils are equal, round, and reactive to light. Neck: Normal range of motion. Neck supple. No thyromegaly present. Normal neck ROM  Negative spurlings   Cardiovascular: Normal rate, regular rhythm and normal heart sounds.  Exam reveals no gallop and no friction rub. No murmur heard. Pulmonary/Chest: Effort normal and breath sounds normal. No respiratory distress. She has no wheezes. Abdominal: Soft. Bowel sounds are normal. She exhibits no mass. There is no tenderness. There is no rebound and no guarding. Musculoskeletal: She exhibits no edema. FROM lower back flexion  SLR negative   Neurological: She is alert. Skin: Skin is warm. Psychiatric: She has a normal mood and affect. Nursing note and vitals reviewed. ASSESSMENT and PLAN  Diagnoses and all orders for this visit:    1. Neck pain  -     XR SPINE CERV 4 OR 5 V; Future  -     REFERRAL TO PHYSICAL THERAPY    2. Chronic low back pain with bilateral sciatica, unspecified back pain laterality   Refer to PT  3. Primary insomnia  -     zolpidem (AMBIEN) 5 mg tablet; Take 1 Tab by mouth nightly as needed for Sleep. Max Daily Amount: 5 mg. Take 1/2 tab hs  4. Upper back pain  -     XR SPINE THORAC 2 V; Future  5. Weight loss   Continue to increase calories in diet    Follow-up and Dispositions    · Return in about 2 months (around 9/9/2019) for f/u insomnia neck pain weight loss.

## 2019-07-09 NOTE — PATIENT INSTRUCTIONS
Reviewed record in preparation for visit and have obtained necessary documentation. Identified pt with two pt identifiers(name and ). Chief Complaint Patient presents with  Insomnia  
  2 month follow up  Fibromyalgia  
  2 month follow up Health Maintenance Due Topic Date Due  
 DTaP/Tdap/Td  (1 - Tdap) 1965  Mammogram  2017  Shingles Vaccine (2 of 2) 04/15/2018  Glaucoma Screening   10/26/2018 Handy Notice Annual Well Visit  2019 Ms. Cristino Goltz has a reminder for a \"due or due soon\" health maintenance. I have asked that she discuss this further with her primary care provider for follow-up on this health maintenance. Coordination of Care Questionnaire: 
:  
 
1) Have you been to an emergency room, urgent care clinic since your last visit? No 
Hospitalized since your last visit? No 
 
2) Have you seen or consulted any other health care providers outside of [address] [place] [address] since your last visit? No  
 
3) In the event something were to happen to you and you were unable to speak on your behalf, do you have an Advance Directive/ Living Will in place stating your wishes? Yes Do you have an Advance Directive on file? YES Office Policies Phone calls/patient messages: Please allow up to 24 hours for someone in the office to contact you about your call or message. Be mindful your provider may be out of the office or your message may require further review. We encourage you to use [facility] for your messages as this is a faster, more efficient way to communicate with our office Medication Refills: 
         
Prescription medications require 48-72 business hours to process. We encourage you to use [facility] for your refills. For controlled medications: Please allow 72 business hours to process. Certain medications may require you to  a written prescription at our office. NO narcotic/controlled medications will be prescribed after 4pm Monday through Friday or on weekends Form/Paperwork Completion: 
         
Please note a $25 fee may incur for all paperwork for completed by our providers. We ask that you allow 7-10 business days. Pre-payment is due prior to picking up/faxing the completed form. You may also download your forms to [employer] to have your doctor print off.

## 2019-07-17 ENCOUNTER — HOSPITAL ENCOUNTER (OUTPATIENT)
Dept: PHYSICAL THERAPY | Age: 74
Discharge: HOME | End: 2019-07-17
Payer: MEDICARE

## 2019-07-17 DIAGNOSIS — M54.2 NECK PAIN: ICD-10-CM

## 2019-07-17 PROCEDURE — 97161 PT EVAL LOW COMPLEX 20 MIN: CPT

## 2019-07-17 PROCEDURE — 97110 THERAPEUTIC EXERCISES: CPT

## 2019-07-17 PROCEDURE — 97014 ELECTRIC STIMULATION THERAPY: CPT

## 2019-07-17 NOTE — PROGRESS NOTES
PT INITIAL EVALUATION NOTE - [facility] 2-15    Patient Name: Zully Lisa  Date:2019  : 1945  [x]  Patient  Verified  Payor: Namrata Turner / Plan: [place] MED[place] & B / Product Type: Medicare /    In time:1230  Out time:130  Total Treatment Time (min): 60  Total Timed Codes (min): 12  1:1 Treatment Time ( [address] only): 35   Visit #: 1     Treatment Area: Neck pain [M54.2]    SUBJECTIVE  Pain Level (0-10 scale): 7  Any medication changes, allergies to medications, adverse drug reactions, diagnosis change, or new procedure performed?: [] No    [x] Yes (see summary sheet for update)  Subjective:    10+ year history of diffuse back pain. S/s progressively getting worse. Her cc is of b/l cervical pain, mid thoracic pain and to a lesser extent lumbar discomfort. Her s/s are worse after cleaning her house. Her sleep is greatly interrupted. X-rays showed Stable mild T7 wedge deformity. Recommend DXA, Facet arthropathy as described with mild left C3-4, C4-5, and C5-6 neural foraminal stenosis. Retired. 77years old. OBJECTIVE/EXAMINATION  Forward head rounded shoulder posture  Mild C curve scoliosis. Decreased lumbar curvature. Lumbar active motion: Forward flexion WFL, at least a 50% reduction in all others  Cervical active motion:  Left and right lateral flexion 15, left and right rotation 50, flexion 40, extension 20. Cervical retraction almost non existent  Moderate tenderness to palpation b/l upper trapezius (trigger points), middle trapezius, paraspinals throughout her entire spine  Hypomobility throughout her entire spine  B/l shoulder flexion and abduction to ~ 160. (-) Spurling's b/l. No history of radicular s/s. No gross strength deficits in her b/l UE.       Modality rationale: decrease pain to improve the patients ability to function with less pain   Min Type Additional Details   15 [x] Estim: []Att   [x]Unatt        []TENS instruct                  []IFC  []Premod   []NMES []Other:  []w/US   []w/ice   [x]w/heat  Position:  Supine, b/l upper trap/middle trapezius  Location:    []  Traction: [] Cervical       []Lumbar                       [] Prone          []Supine                       []Intermittent   []Continuous Lbs:  [] before manual  [] after manual  []w/heat    []  Ultrasound: []Continuous   [] Pulsed at:                           []1MHz   []3MHz Location:  W/cm2:    [] Paraffin         Location:   []w/heat    []  Ice     []  Heat  []  Ice massage Position:  Location:    []  Laser  []  Other: Position:  Location:      []  Vasopneumatic Device Pressure:       [] lo [] med [] hi   Temperature:      [x] Skin assessment post-treatment:  [x]intact []redness- no adverse reaction    []redness - adverse reaction:     12 min Therapeutic Exercise:  [x] See flow sheet :   Rationale: increase ROM and increase strength to improve the patients ability to function with less pain          With   [x] TE   [] TA   [] neuro   [] other: Patient Education: [x] Review HEP    [] Progressed/Changed HEP based on:   [] positioning   [] body mechanics   [] transfers   [] heat/ice application    [] other: sitting posture     Other Objective/Functional Measures: FOTO 55   Goal 63    Pain Level (0-10 scale) post treatment: 5    ASSESSMENT/Changes in Function:   Patient with chronic back pain. Her primary physical therapy findings include diffuse tenderness to palpation, hypomobility, motion loss, weakness, pain, interrupted sleep, decreased ADL tolerance.     [x]  See Plan of 718 Ignacia Minor, PT 7/17/2019

## 2019-07-17 NOTE — PROGRESS NOTES
[place] [place] [id number] (MOB IV), [address] [facility],  [address]  Phone: [phone] Fax: [phone]     Plan of Care/Statement of Necessity for Physical Therapy Services  2-15    Patient name: Yousif Gordon  : 1945  Provider#: [id number]  Referral source: Quintin Xiong MD      Medical/Treatment Diagnosis: Neck pain [M54.2]     Prior Hospitalization: see medical history     Comorbidities: see emr  Prior Level of Function: restricted   Medications: Verified on Patient Summary List  Start of Care: 19      Onset Date: chronic > than 10 years   The Plan of Care and following information is based on the information from the initial evaluation. Assessment/ key information: Patient with chronic back pain. Her primary physical therapy findings include diffuse tenderness to palpation, hypomobility, motion loss, weakness, pain, interrupted sleep, decreased ADL tolerance.     Evaluation Complexity History MEDIUM  Complexity : 1-2 comorbidities / personal factors will impact the outcome/ POC ; Examination LOW Complexity : 1-2 Standardized tests and measures addressing body structure, function, activity limitation and / or participation in recreation  ;Presentation LOW Complexity : Stable, uncomplicated  ;Clinical Decision Making MEDIUM Complexity : FOTO score of 26-74  Overall Complexity Rating: LOW     Problem List: pain affecting function, decrease ROM, decrease strength, decrease ADL/ functional abilitiies, decrease activity tolerance and decrease flexibility/ joint mobility   Treatment Plan may include any combination of the following: Therapeutic exercise, Therapeutic activities, Physical agent/modality, Manual therapy and Patient education  Patient / Family readiness to learn indicated by: asking questions and trying to perform skills  Persons(s) to be included in education: patient (P)  Barriers to Learning/Limitations: yes;  language  Patient Goal (s): less pain  Patient Self Reported Health Status: good  Rehabilitation Potential: good    Short Term Goals: To be accomplished in 4 weeks:   Patient will be independent with a progressive home exercise program addressing all objective deficits. At least a 8 Point improvement in the FOTO score indicating improved overall quality of life. Frequency / Duration: Patient to be seen 2 times per week for 4 weeks. Patient/ Caregiver education and instruction: self care and exercises    [x]  Plan of care has been reviewed with PTA        Certification Period: 7/17/19 - 9/1/19  Mary Craig, PT 7/17/2019     ________________________________________________________________________    I certify that the above Therapy Services are being furnished while the patient is under my care. I agree with the treatment plan and certify that this therapy is necessary.     [facility] Signature:____________________  Date:____________Time: _________

## 2019-07-24 ENCOUNTER — HOSPITAL ENCOUNTER (OUTPATIENT)
Dept: PHYSICAL THERAPY | Age: 74
Discharge: HOME | End: 2019-07-24
Payer: MEDICARE

## 2019-07-24 PROCEDURE — 97110 THERAPEUTIC EXERCISES: CPT

## 2019-07-24 PROCEDURE — 97014 ELECTRIC STIMULATION THERAPY: CPT

## 2019-07-24 NOTE — PROGRESS NOTES
PT DAILY TREATMENT NOTE - [facility] 2-15    Patient Name: Jose Armando Del Toro  Date:2019  : 1945  [x]  Patient  Verified  Payor: Raminnoemi Martinezing / Plan: [place] MEDICARE PART A & B / Product Type: Medicare /    In time:1000  Out time:1102  Total Treatment Time (min): 62  Total Timed Codes (min): 62  1:1 Treatment Time ( [address] only): 32   Visit #:  2    Treatment Area: Low back pain [M54.5]  Neck pain [M54.2]    SUBJECTIVE  Pain Level (0-10 scale): 1010  Any medication changes, allergies to medications, adverse drug reactions, diagnosis change, or new procedure performed?: [x] No    [] Yes (see summary sheet for update)  Subjective functional status/changes:   [] No changes reported  Patient reports she has been keeping up with all of her HEP, however she isn't sure whether or not she is doing it correctly.      OBJECTIVE    Modality rationale: decrease pain and increase tissue extensibility to improve the patients ability to perform ADLs and reduce pain levels   Min Type Additional Details      15 [x] Estim: []Att   [x]Unatt    []TENS instruct                  [x]IFC  []Premod   []NMES                     []Other:  []w/US   []w/ice   [x]w/heat  Position: supine  Location: neck       []  Traction: [] Cervical       []Lumbar                       [] Prone          []Supine                       []Intermittent   []Continuous Lbs:  [] before manual  [] after manual  []w/heat    []  Ultrasound: []Continuous   [] Pulsed                       at: []1MHz   []3MHz Location:  W/cm2:    [] Paraffin         Location:   []w/heat    []  Ice     []  Heat  []  Ice massage Position:  Location:    []  Laser  []  Other: Position:  Location:      []  Vasopneumatic Device Pressure:       [] lo [] med [] hi   Temperature:      [x] Skin assessment post-treatment:  [x]intact []redness- no adverse reaction    []redness  adverse reaction:     47 min Therapeutic Exercise:  [x] See flow sheet :   Rationale: increase ROM and increase strength to improve the patients ability to perform ADLs and reduce pain levels     With   [] TE   [] TA   [] neuro   [] other: Patient Education: [x] Review HEP    [] Progressed/Changed HEP based on:   [] positioning   [] body mechanics   [] transfers   [] heat/ice application    [] other:      Other Objective/Functional Measures: none noted     Pain Level (0-10 scale) post treatment: 810    ASSESSMENT/Changes in Function:   Patient will require VCs in order to reduce compensation patterns. Will show slight upper trap compensation patterns with scapular retraction. Will progress as tolerated. Patient will continue to benefit from skilled PT services to modify and progress therapeutic interventions, address functional mobility deficits, address ROM deficits, address strength deficits, analyze and address soft tissue restrictions, analyze and cue movement patterns, analyze and modify body mechanics/ergonomics and assess and modify postural abnormalities to attain remaining goals. [x]  See Plan of Care  []  See progress note/recertification  []  See Discharge Summary         Progress towards goals / Updated goals:  Patient is progressing towards goals.      PLAN  [x]  Upgrade activities as tolerated     [x]  Continue plan of care  [x]  Update interventions per flow sheet       []  Discharge due to:_  []  Other:_      Blossom Duncan 7/24/2019

## 2019-08-12 ENCOUNTER — APPOINTMENT (OUTPATIENT)
Dept: PHYSICAL THERAPY | Age: 74
End: 2019-08-12

## 2019-08-29 NOTE — PROGRESS NOTES
[employer] [facility]  Ander Jerez (MOB IV), [address] [facility] [address],  [address]  Phone: [phone] Fax: [phone]    Discharge Summary 2-15    Patient name: Belem Romero  : 1945  Provider#: [id number]  Referral source: Mars Castellanos MD      Medical/Treatment Diagnosis: Low back pain [M54.5]  Neck pain [M54.2]     Prior Hospitalization: see medical history     Comorbidities: See Plan of Care  Prior Level of Function: See Plan of Care  Medications: Verified on Patient Summary List    Start of Care: 2019     Onset Date:Chronic   Visits from Start of Care: 2     Missed Visits: 1  Reporting Period : 2019 to 2019    Assessment/Summary of care: Pt is discharged today, 2019, as they have stopped attending therapy. Final objective data and outcomes were unable to be obtained.         RECOMMENDATIONS:  [x]Discontinue therapy: []Patient has reached or is progressing toward set goals     [x]Patient is non-compliant or has abdicated     []Due to lack of appreciable progress towards set goals     []Other  Carmen Brothers, PT 2019

## 2019-09-07 NOTE — PROGRESS NOTES
HISTORY OF PRESENT ILLNESS  Merrill Matias is a 75 y.o. female. HPI   2 ander f/u weight loss, fibromyalgia insomnia, neck pain  ambien prescribed for insomnia  xr neck and thoracic spine -facet arthritis and foraminal stenosis, stable T7 wedge deformity-- on prolia per rheum MD  PT referral-- went to PT 1 time   Not improved, maybe worse per pt  Takes ambien 5 mg hs now--wakes after 3 hrs so now just takes at around midnight when she wakes up. Does help some, needs refill    Colonoscopy 2013-negative, hemorrhoids    C/o pain , tinnitus right ear--x 1 month       Last OV   f/u fibromyalgia, weight loss and insomnia  Took remeron for 1 month-only helped slightly with sleep but still only sleeping 3-4 hrs   Awakes due to pain in upper back  Has pain that is worse at night  Pain is sharp and radiates to arms  Also has low back pain and legs pain but not as severe  ES tylenol not helping the pain     Above pain has been present for years but upper back pain is getting worse per pt     Weight down 1 lb-consumes 3 meals per day and appetite ok per pt       Patient Active Problem List    Diagnosis Date Noted    Gallstones 02/03/2019    HLD (hyperlipidemia) 03/10/2014    Epigastric pain 11/07/2013    Blood in stool 11/07/2013    Constipation 11/07/2013    Occult blood positive stool 11/07/2013    Interstitial cystitis 02/24/2011    Asthma 02/12/2010    Fibromyalgia 02/12/2010    Osteoporosis 02/12/2010    Vitamin D deficiency 02/12/2010     Current Outpatient Medications   Medication Sig Dispense Refill    calcium glycerophosphate (PRELIEF PO) Take  by mouth.  zolpidem (AMBIEN) 5 mg tablet Take 1 Tab by mouth nightly as needed for Sleep. Max Daily Amount: 5 mg. 30 Tab 1    mirtazapine (REMERON) 7.5 mg tablet Take 1 Tab by mouth nightly. Indications: Disorder characterized by Stiff, Tender & Painful Muscles 90 Tab 1    vit A,C,E-zinc-copper (ICAPS AREDS) cap capsule Take 1 Cap by mouth.       naproxen (NAPROSYN) 375 mg tablet Take 1 Tab by mouth two (2) times daily as needed. For neck or back pain 30 Tab 1    traMADol (ULTRAM) 50 mg tablet Take 1 Tab by mouth every six (6) hours as needed for Pain. Max Daily Amount: 200 mg. Indications: Pain 30 Tab 1    budesonide (PULMICORT FLEXHALER) 180 mcg/actuation aepb inhaler Take  by inhalation.  montelukast (SINGULAIR) 10 mg tablet TK 1 T PO IN THE MAGDIEL  5    Magnesium Oxide 500 mg cap Take 300 mg by mouth. 1 tab a day      CALCIUM CARBONATE/VITAMIN D3 (CALCIUM 600 + D PO) Take  by mouth. No Known Allergies   Lab Results   Component Value Date/Time    WBC 4.6 02/04/2019 02:33 PM    HGB 12.5 02/04/2019 02:33 PM    HCT 38.5 02/04/2019 02:33 PM    PLATELET 887 59/59/4367 02:33 PM    MCV 96 02/04/2019 02:33 PM     Lab Results   Component Value Date/Time    GFR est non-AA 78 02/04/2019 02:33 PM    GFRNA, POC >60 08/08/2018 10:08 AM    GFR est AA 89 02/04/2019 02:33 PM    GFRAA, POC >60 08/08/2018 10:08 AM    Creatinine 0.76 02/04/2019 02:33 PM    Creatinine (POC) 0.7 08/08/2018 10:08 AM    BUN 14 02/04/2019 02:33 PM    Sodium 146 (H) 02/04/2019 02:33 PM    Potassium 4.4 02/04/2019 02:33 PM    Chloride 106 02/04/2019 02:33 PM    CO2 19 (L) 02/04/2019 02:33 PM    PTH, Intact 50.6 02/12/2010 11:15 AM     Lab Results   Component Value Date/Time    TSH 2.730 02/04/2019 02:33 PM         ROS    Physical Exam   Constitutional: She appears well-developed and well-nourished. Appears stated age, thin underweight NAD   HENT:   TM's clear, minimal wax in right ear canal   Neck: No JVD present. No tracheal deviation present. No thyromegaly present. Cardiovascular: Normal rate, regular rhythm and normal heart sounds. Exam reveals no gallop and no friction rub. No murmur heard. Pulmonary/Chest: Effort normal and breath sounds normal. No respiratory distress. She has no wheezes. Abdominal: Soft. Bowel sounds are normal.   Musculoskeletal: She exhibits no edema. Lymphadenopathy:     She has no cervical adenopathy. Neurological: She is alert. Psychiatric: She has a normal mood and affect. Nursing note and vitals reviewed. ASSESSMENT and PLAN  Diagnoses and all orders for this visit:    1. Primary insomnia  -     zolpidem (AMBIEN) 5 mg tablet; Take 1 Tab by mouth nightly as needed for Sleep. Max Daily Amount: 5 mg.    2. Weight loss  -     REFERRAL TO GASTROENTEROLOGY    3. Loose stools  -     REFERRAL TO GASTROENTEROLOGY    4. Other specified hearing loss of right ear, unspecified hearing status on contralateral side  -     REFERRAL TO ENT-OTOLARYNGOLOGY    5. Chronic neck pain   Pt will restart PT  6. Chest pain, unspecified type  -     CT CHEST W CONT; Future   Appears mostly back pain but weight loss ongoing   Not exertional CP  7. Breast screening  -     [facility] MAMMO BI SCREENING INCL CAD; Future      Follow-up and Dispositions    · Return in about 3 months (around 12/9/2019) for f/u neck pain , weight loss.

## 2019-09-09 ENCOUNTER — OFFICE VISIT (OUTPATIENT)
Dept: INTERNAL MEDICINE CLINIC | Age: 74
End: 2019-09-09

## 2019-09-09 VITALS
HEIGHT: 60 IN | BODY MASS INDEX: 17.08 KG/M2 | HEART RATE: 82 BPM | WEIGHT: 87 LBS | SYSTOLIC BLOOD PRESSURE: 120 MMHG | TEMPERATURE: 97.9 F | DIASTOLIC BLOOD PRESSURE: 69 MMHG | RESPIRATION RATE: 16 BRPM | OXYGEN SATURATION: 99 %

## 2019-09-09 DIAGNOSIS — H91.8X1 OTHER SPECIFIED HEARING LOSS OF RIGHT EAR, UNSPECIFIED HEARING STATUS ON CONTRALATERAL SIDE: ICD-10-CM

## 2019-09-09 DIAGNOSIS — R63.4 WEIGHT LOSS: ICD-10-CM

## 2019-09-09 DIAGNOSIS — R07.9 CHEST PAIN, UNSPECIFIED TYPE: ICD-10-CM

## 2019-09-09 DIAGNOSIS — F51.01 PRIMARY INSOMNIA: Primary | ICD-10-CM

## 2019-09-09 DIAGNOSIS — R19.5 LOOSE STOOLS: ICD-10-CM

## 2019-09-09 DIAGNOSIS — Z12.39 BREAST SCREENING: ICD-10-CM

## 2019-09-09 DIAGNOSIS — G89.29 CHRONIC NECK PAIN: ICD-10-CM

## 2019-09-09 DIAGNOSIS — M54.2 CHRONIC NECK PAIN: ICD-10-CM

## 2019-09-09 RX ORDER — ZOLPIDEM TARTRATE 5 MG/1
5 TABLET ORAL
Qty: 30 TAB | Refills: 5 | Status: SHIPPED | OUTPATIENT
Start: 2019-09-09 | End: 2019-12-04 | Stop reason: SDUPTHER

## 2019-09-09 NOTE — PROGRESS NOTES
Chief Complaint   Patient presents with    Insomnia     2 month follow up    Medication Evaluation     Zolpidem    Weight Loss     loss 1 pound    Neck Pain     2 month follow up    Ear Fullness     hear noise ,worse right ear

## 2019-09-09 NOTE — PATIENT INSTRUCTIONS
Office Policies    Phone calls/patient messages:            Please allow up to 24 hours for someone in the office to contact you about your call or message. Be mindful your provider may be out of the office or your message may require further review. We encourage you to use [employer] for your messages as this is a faster, more efficient way to communicate with our office                         Medication Refills:            Prescription medications require 48-72 business hours to process. We encourage you to use [employer] for your refills. For controlled medications: Please allow 72 business hours to process. Certain medications may require you to  a written prescription at our office. NO narcotic/controlled medications will be prescribed after 4pm Monday through Friday or on weekends              Form/Paperwork Completion:            Please note a $25 fee may incur for all paperwork for completed by our providers. We ask that you allow 7-10 business days. Pre-payment is due prior to picking up/faxing the completed form. You may also download your forms to [employer] to have your doctor print off.

## 2019-09-10 ENCOUNTER — TELEPHONE (OUTPATIENT)
Dept: INTERNAL MEDICINE CLINIC | Age: 74
End: 2019-09-10

## 2019-09-10 NOTE — TELEPHONE ENCOUNTER
Called, spoke to pt. Two identifiers confirmed. Pt requesting referral to be faxed to [facility]. Notified pt referral will be faxed today. Pt verbalized understanding of information discussed w/ no further questions at this time. Referral faxed to PT. Confirmation received.

## 2019-09-18 ENCOUNTER — HOSPITAL ENCOUNTER (OUTPATIENT)
Dept: PHYSICAL THERAPY | Age: 74
Discharge: HOME | End: 2019-09-18
Payer: MEDICARE

## 2019-09-18 PROCEDURE — 97162 PT EVAL MOD COMPLEX 30 MIN: CPT | Performed by: PHYSICAL THERAPIST

## 2019-09-18 PROCEDURE — 97110 THERAPEUTIC EXERCISES: CPT | Performed by: PHYSICAL THERAPIST

## 2019-09-18 NOTE — PROGRESS NOTES
PT INITIAL EVALUATION NOTE - [facility] 2-15    Patient Name: Charmaine Miller  Date:2019  : 1945  [x]  Patient  Verified  Payor: [place] MEDICARE / Plan: [place] MEDICARE PART A & B / Product Type: Medicare /    In time: 10:35am  Out time: 11:25am  Total Treatment Time (min): 50  Total Timed Codes (min): 25  1:1 Treatment Time ( [address] only): 50   Visit #: 1     Treatment Area: Neck pain [M54.2]    SUBJECTIVE  Pain Level (0-10 scale): 8 (8-10/10)  Any medication changes, allergies to medications, adverse drug reactions, diagnosis change, or new procedure performed?: [] No    [x] Yes (see summary sheet for update)  Subjective:    Taken from evaluation for same diagnosis on 19: \" 10+ year history of diffuse back pain. S/s progressively getting worse. Her cc is of b/l cervical pain, mid thoracic pain and to a lesser extent lumbar discomfort. Her s/s are worse after cleaning her house. Her sleep is greatly interrupted. X-rays showed Stable mild T7 wedge deformity. Recommend DXA, Facet arthropathy as described with mild left C3-4, C4-5, and C5-6 neural foraminal stenosis. Retired. 77years old. \" It wakes her up at night, which is when it's the worst.    OBJECTIVE    Posture:  Scapular protraction bilaterally  Other Observations:  Stiff upper trunk  Gait and Functional Mobility:  Minimal trunk rotation  Palpation: tender to bilateral upper trap/cervical/thoracic        Cervical AROM:        R  L    Flexion    50      Extension   45, p! On left and center      Side Bending   P!  On left Stretch on right    Rotation   48  50            UPPER QUARTER   MUSCLE STRENGTH  KEY       R  L  0 - No Contraction  C1, C2 Neck Flex nt  nt  1 - Trace   C3 Side Flex  nt  nt  2 - Poor   C4 Sh Elev  4-  4-  3 - Fair    C5 Deltoid/Biceps 4-  4-  4 - Good   C6 Wrist Ext  nt  nt  5 - Normal   C7 Triceps  4-  4-      C8 Thumb Ext  nt  nt      T1 Hand Inst  nt  nt    Flexibility: tight pecs, UTs, paraspinals   Mobility Assessment: hypomobile thoracic and cervical spine      MMT: Shoulder ER: 3+/5 bilaterally  Neurological: Reflexes / Sensations: nt    25 min Therapeutic Exercise:  [x] See flow sheet :   Rationale: increase ROM, increase strength and improve coordination to improve the patients ability to perform daily activities.            With   [x] TE   [] TA   [] neuro   [] other: Patient Education: [x] Review HEP    [x] Progressed/Changed HEP based on:   [x] positioning   [x] body mechanics   [] transfers   [] heat/ice application    [] other:      Other Objective/Functional Measures: FOTO= 59    Pain Level (0-10 scale) post treatment: 8    ASSESSMENT/Changes in Function:     [x]  See Plan of [facility], PT 9/18/2019

## 2019-09-18 NOTE — PROGRESS NOTES
[address] [address] ([facility] [address] [facility] Cony Mcdermott  Phone: [phone] Fax: [phone]     Plan of Care/Statement of Necessity for Physical Therapy Services  2-15    Patient name: Charmaine Miller  : 1945  Provider#: [id number]  Referral source: Cyndy Hampton MD      Medical/Treatment Diagnosis: Neck pain [M54.2]     Prior Hospitalization: see medical history     Comorbidities: allergies, arthritis, asthma, back pain, headaches, hearing impairment, visual impairment  Prior Level of Function: 20 min of exercise at least 3x/wk  Medications: Verified on Patient Summary List  Start of Care: 19      Onset Date: chronic   The Plan of Care and following information is based on the information from the initial evaluation. Assessment/ key information: The patient was evaluated for the same diagnosis on 19 and only showed up for one follow up appointment thereafter, subsequently being discharged from therapy. She now presents with the same chief complaint of chronic neck, upper back, and some low back pain, especially with activity. X-rays on 19 show a T7 wedge deformity and C3-C6 facet arthropathy. The patient's poor posture, hypomobility in the thoracic and cervical spine, and decreased periscapular strength exacerbate her underlying arthritic conditions. The patient will benefit from guided therapeutic interventions such as therex for strengthening and neuromuscular re-eduction, manual techniques for joint mobility and soft tissue extensibility, and modalities for pain management in order to improve functional mobility with daily activities.     Evaluation Complexity History MEDIUM  Complexity : 1-2 comorbidities / personal factors will impact the outcome/ POC ; Examination MEDIUM Complexity : 3 Standardized tests and measures addressing body structure, function, activity limitation and / or participation in recreation  ;Presentation MEDIUM Complexity : Evolving with changing characteristics  ; Clinical Decision Making MEDIUM Complexity : FOTO score of 26-74  Overall Complexity Rating: MEDIUM    Problem List: pain affecting function, decrease ROM, decrease strength, decrease ADL/ functional abilitiies, decrease activity tolerance, decrease flexibility/ joint mobility and decrease transfer abilities   Treatment Plan may include any combination of the following: Therapeutic exercise, Therapeutic activities, Neuromuscular re-education, Physical agent/modality, Manual therapy, Patient education, Self Care training, Functional mobility training and Home safety training  Patient / Family readiness to learn indicated by: asking questions, trying to perform skills and interest  Persons(s) to be included in education: patient (P)  Barriers to Learning/Limitations: None  Patient Goal (s): want to feel better, less pain  Patient Self Reported Health Status: fair  Rehabilitation Potential: fair/good    Short Term Goals: To be accomplished in 2 treatments:                         1.) The patient will be independent with their HEP consistently for at least one week. Long Term Goals: To be accomplished in 16 treatments:                         1.) The patient will have an average of 7/10 pain with daily activities. 2.) The patient will improve her cervical rotation AROM to at least 55 degrees bilaterally. 3.) The patient will improve their FOTO score from 59 to at least 63 to show improvements in functional mobility. Frequency / Duration: Patient to be seen 2 times per week for 16 treatments.       Patient/ Caregiver education and instruction: self care, activity modification and exercises    [x]  Plan of care has been reviewed with PTA        Certification Period: 9/18/19-12/18/19  Burgess Cardenas, PT 9/18/2019     ________________________________________________________________________    I certify that the above Therapy Services are being furnished while the patient is under my care. I agree with the treatment plan and certify that this therapy is necessary.     [de-identified] Signature:____________________  Date:____________Time: _________

## 2019-09-25 ENCOUNTER — HOSPITAL ENCOUNTER (OUTPATIENT)
Dept: PHYSICAL THERAPY | Age: 74
Discharge: HOME | End: 2019-09-25
Payer: MEDICARE

## 2019-09-25 PROCEDURE — 97014 ELECTRIC STIMULATION THERAPY: CPT | Performed by: PHYSICAL THERAPIST

## 2019-09-25 PROCEDURE — 97110 THERAPEUTIC EXERCISES: CPT | Performed by: PHYSICAL THERAPIST

## 2019-09-25 NOTE — PROGRESS NOTES
PT DAILY TREATMENT NOTE - [facility] 2-15    Patient Name: Jose Armando Del Toro  Date:2019  : 1945  [x]  Patient  Verified  Payor: [place] MEDICARE / Plan: [place] MEDICARE PART A & B / Product Type: Medicare /    In time: 10:31am  Out time: 11:30am  Total Treatment Time (min): 59  Total Timed Codes (min): 44  1:1 Treatment Time ( only): 30  Visit #:  2    Treatment Area: Neck pain [M54.2]    SUBJECTIVE  Pain Level (0-10 scale): 7  Any medication changes, allergies to medications, adverse drug reactions, diagnosis change, or new procedure performed?: [x] No    [] Yes (see summary sheet for update)  Subjective functional status/changes:   [] No changes reported  The patient reports that she had difficulty attaching the band to the door to do some exercises. OBJECTIVE    Modality rationale: decrease edema, decrease inflammation, decrease pain and increase tissue extensibility to improve the patients ability to perform daily activities.     Min Type Additional Details    15   [x] Estim: []Att   [x]Unatt    []TENS instruct                  [x]IFC  []Premod   []NMES                     []Other:  []w/US   []w/ice   [x]w/heat  Position: supine  Location: cervical       []  Traction: [] Cervical       []Lumbar                       [] Prone          []Supine                       []Intermittent   []Continuous Lbs:  [] before manual  [] after manual  []w/heat    []  Ultrasound: []Continuous   [] Pulsed                       at: []1MHz   []3MHz Location:  W/cm2:    [] Paraffin         Location:   []w/heat    []  Ice     []  Heat  []  Ice massage Position:  Location:    []  Laser  []  Other: Position:  Location:      []  Vasopneumatic Device Pressure:       [] lo [] med [] hi   Temperature:      [x] Skin assessment post-treatment:  [x]intact []redness- no adverse reaction    []redness  adverse reaction:     44 min Therapeutic Exercise:  [x] See flow sheet :   Rationale: increase ROM, increase strength and improve coordination to improve the patients ability to perform daily activities. With   [x] TE   [] TA   [] neuro   [] other: Patient Education: [x] Review HEP    [x] Progressed/Changed HEP based on:   [x] positioning   [x] body mechanics   [] transfers   [] heat/ice application    [] other:      Other Objective/Functional Measures: Pt. Needed some cues on HEP     Pain Level (0-10 scale) post treatment: 8    ASSESSMENT/Changes in Function:   The patient is progressing with strengthening and mobility as tolerated. Patient will continue to benefit from skilled PT services to modify and progress therapeutic interventions, address functional mobility deficits, address ROM deficits, address strength deficits, analyze and address soft tissue restrictions, analyze and cue movement patterns, analyze and modify body mechanics/ergonomics, assess and modify postural abnormalities, address imbalance/dizziness and instruct in home and community integration to attain remaining goals. [x]  See Plan of Care  []  See progress note/recertification  []  See Discharge Summary         Progress towards goals / Updated goals: The patient is progressing towards goals.      PLAN  [x]  Upgrade activities as tolerated     [x]  Continue plan of care  [x]  Update interventions per flow sheet       []  Discharge due to:_  []  Other:_      Ayo Grant, PT 9/25/2019

## 2019-10-01 ENCOUNTER — HOSPITAL ENCOUNTER (OUTPATIENT)
Dept: PHYSICAL THERAPY | Age: 74
Discharge: HOME | End: 2019-10-01
Payer: MEDICARE

## 2019-10-01 PROCEDURE — 97110 THERAPEUTIC EXERCISES: CPT

## 2019-10-01 PROCEDURE — 97014 ELECTRIC STIMULATION THERAPY: CPT

## 2019-10-01 NOTE — PROGRESS NOTES
PT DAILY TREATMENT NOTE - [facility] 2-15    Patient Name: Irina Acosta  Date:10/1/2019  : 1945  [x]  Patient  Verified  Payor: [place] MEDICARE / Plan: [place] MEDICARE PART A & B / Product Type: Medicare /    In time:1200  Out time:102  Total Treatment Time (min): 62  Total Timed Codes (min): 62  1:1 Treatment Time ( only): 27   Visit #:  3    Treatment Area: Neck pain [M54.2]    SUBJECTIVE  Pain Level (0-10 scale): 7/10  Any medication changes, allergies to medications, adverse drug reactions, diagnosis change, or new procedure performed?: [x] No    [] Yes (see summary sheet for update)  Subjective functional status/changes:   [] No changes reported  Patient reports she is woken up by pain nearly every night around 12-1 due to pain in her upper back.     OBJECTIVE    Modality rationale: decrease pain and increase tissue extensibility to improve the patients ability to perform ADLs and reduce pain levels   Min Type Additional Details      15 [x] Estim: []Att   [x]Unatt    []TENS instruct                  [x]IFC  []Premod   []NMES                     []Other:  []w/US   []w/ice   [x]w/heat  Position: supine  Location: thoracic/neck       []  Traction: [] Cervical       []Lumbar                       [] Prone          []Supine                       []Intermittent   []Continuous Lbs:  [] before manual  [] after manual  []w/heat    []  Ultrasound: []Continuous   [] Pulsed                       at: []1MHz   []3MHz Location:  W/cm2:    [] Paraffin         Location:   []w/heat    []  Ice     []  Heat  []  Ice massage Position:  Location:    []  Laser  []  Other: Position:  Location:      []  Vasopneumatic Device Pressure:       [] lo [] med [] hi   Temperature:      [x] Skin assessment post-treatment:  [x]intact []redness- no adverse reaction    []redness  adverse reaction:     47 min Therapeutic Exercise:  [] See flow sheet :   Rationale: increase ROM and increase strength to improve the patients ability to perform ADLs and reduce pain levels          With   [] TE   [] TA   [] neuro   [] other: Patient Education: [x] Review HEP    [] Progressed/Changed HEP based on:   [] positioning   [] body mechanics   [] transfers   [] heat/ice application    [] other:      Other Objective/Functional Measures: none noted     Pain Level (0-10 scale) post treatment: 5/10    ASSESSMENT/Changes in Function:   Patient will require cues in order to properly perform therex. Fair scapular retraction and control. Will continue to progress as tolerated. Patient will continue to benefit from skilled PT services to modify and progress therapeutic interventions, address functional mobility deficits, address ROM deficits, address strength deficits, analyze and address soft tissue restrictions, analyze and cue movement patterns, analyze and modify body mechanics/ergonomics and assess and modify postural abnormalities to attain remaining goals. [x]  See Plan of Care  []  See progress note/recertification  []  See Discharge Summary         Progress towards goals / Updated goals:  Patient is progressing towards goals.      PLAN  [x]  Upgrade activities as tolerated     [x]  Continue plan of care  [x]  Update interventions per flow sheet       []  Discharge due to:_  []  Other:_      Claudell Soho 10/1/2019

## 2019-10-09 ENCOUNTER — HOSPITAL ENCOUNTER (OUTPATIENT)
Dept: PHYSICAL THERAPY | Age: 74
Discharge: HOME | End: 2019-10-09
Payer: MEDICARE

## 2019-10-09 PROCEDURE — 97014 ELECTRIC STIMULATION THERAPY: CPT

## 2019-10-09 PROCEDURE — 97110 THERAPEUTIC EXERCISES: CPT

## 2019-10-09 NOTE — PROGRESS NOTES
PT DAILY TREATMENT NOTE - [facility] 2-15    Patient Name: Skipper Dance  Date:10/9/2019  : 1945  [x]  Patient  Verified  Payor: [place] MEDICARE / Plan: [place] MEDICARE PART A & B / Product Type: Medicare /    In time:1030  Out time:1125  Total Treatment Time (min): 55  Total Timed Codes (min): 55  1:1 Treatment Time ( [address] only): 54   Visit #:  4    Treatment Area: Neck pain [M54.2]    SUBJECTIVE  Pain Level (0-10 scale): 7/10  Any medication changes, allergies to medications, adverse drug reactions, diagnosis change, or new procedure performed?: [x] No    [] Yes (see summary sheet for update)  Subjective functional status/changes:   [] No changes reported  Patient reports after last visit she felt much for the next two days, but as time went on the pain returned. OBJECTIVE    Modality rationale: decrease pain and increase tissue extensibility to improve the patients ability to perform ADLs and reduce pain levels   Min Type Additional Details      15 [x] Estim: []Att   [x]Unatt    []TENS instruct                  [x]IFC  []Premod   []NMES                     []Other:  []w/US   []w/ice   [x]w/heat  Position: supine  Location: neck/thoracic back.        []  Traction: [] Cervical       []Lumbar                       [] Prone          []Supine                       []Intermittent   []Continuous Lbs:  [] before manual  [] after manual  []w/heat    []  Ultrasound: []Continuous   [] Pulsed                       at: []1MHz   []3MHz Location:  W/cm2:    [] Paraffin         Location:   []w/heat    []  Ice     []  Heat  []  Ice massage Position:  Location:    []  Laser  []  Other: Position:  Location:      []  Vasopneumatic Device Pressure:       [] lo [] med [] hi   Temperature:      [x] Skin assessment post-treatment:  [x]intact []redness- no adverse reaction    []redness  adverse reaction:     40 min Therapeutic Exercise:  [x] See flow sheet :   Rationale: increase ROM and increase strength to improve the patients ability to perform ADLs and reduce pain levels    With   [] TE   [] TA   [] neuro   [] other: Patient Education: [x] Review HEP    [] Progressed/Changed HEP based on:   [] positioning   [] body mechanics   [] transfers   [] heat/ice application    [] other:      Other Objective/Functional Measures: none note     Pain Level (0-10 scale) post treatment: 5-6/10    ASSESSMENT/Changes in Function:   Patient will show heavy upper trap compensation L>R. Will require clear cues in order to properly perform therex. Will continue to progress as tolerated. Patient will continue to benefit from skilled PT services to modify and progress therapeutic interventions, address functional mobility deficits, address ROM deficits, address strength deficits, analyze and address soft tissue restrictions, analyze and cue movement patterns, analyze and modify body mechanics/ergonomics and assess and modify postural abnormalities to attain remaining goals. [x]  See Plan of Care  []  See progress note/recertification  []  See Discharge Summary         Progress towards goals / Updated goals:  Patient is progressing towards goals.      PLAN  [x]  Upgrade activities as tolerated     [x]  Continue plan of care  [x]  Update interventions per flow sheet       []  Discharge due to:_  []  Other:_      Elodia Teixeira 10/9/2019

## 2019-10-16 ENCOUNTER — APPOINTMENT (OUTPATIENT)
Dept: PHYSICAL THERAPY | Age: 74
End: 2019-10-16
Payer: MEDICARE

## 2019-12-03 NOTE — ANCILLARY DISCHARGE INSTRUCTIONS
[facility] [address] ([place]), [address] [place] Phone: [phone] Fax: [phone] Discharge Summary 2-15 Patient name: Michelle Brennan  : 1945  Provider#: [id number] Referral source: Joie Mcgraw MD     
Medical/Treatment Diagnosis: Neck pain [M54.2] Prior Hospitalization: see medical history Comorbidities: See Plan of Care Prior Level of Function: See Plan of Care Medications: Verified on Patient Summary List 
 
Start of Care: 19      Onset Date: chronic Visits from Start of Care: 4     Missed Visits: 0 Reporting Period : 19 to 12/3/19 Assessment/Summary of care: The patient was progressing well with her cervical and thoracic pain, initially having frequently 8/10 pain but left with 5-6/10 at the end of the last session on 10/9/19, however, she did not schedule any more follow up appointments. This is the second time this patient stopped coming to therapy without completion within the past year for the same issue. Pt is discharged today, 12/3/2019, as they have stopped attending therapy. Final objective data and outcomes were unable to be obtained. RECOMMENDATIONS: 
[x]Discontinue therapy: []Patient has reached or is progressing toward set goals [x]Patient is non-compliant or has abdicated 
   []Due to lack of appreciable progress towards set goals []Terry Nguyễn, PT 12/3/2019

## 2019-12-04 ENCOUNTER — OFFICE VISIT (OUTPATIENT)
Dept: INTERNAL MEDICINE CLINIC | Age: 74
End: 2019-12-04

## 2019-12-04 ENCOUNTER — HOSPITAL ENCOUNTER (OUTPATIENT)
Dept: LAB | Age: 74
Discharge: HOME | End: 2019-12-04
Payer: MEDICARE

## 2019-12-04 VITALS
WEIGHT: 84 LBS | HEIGHT: 60 IN | DIASTOLIC BLOOD PRESSURE: 66 MMHG | TEMPERATURE: 97.8 F | HEART RATE: 88 BPM | RESPIRATION RATE: 16 BRPM | BODY MASS INDEX: 16.49 KG/M2 | OXYGEN SATURATION: 98 % | SYSTOLIC BLOOD PRESSURE: 103 MMHG

## 2019-12-04 DIAGNOSIS — Z12.39 BREAST SCREENING: ICD-10-CM

## 2019-12-04 DIAGNOSIS — M89.8X9 BONE PAIN: Primary | ICD-10-CM

## 2019-12-04 DIAGNOSIS — R63.4 WEIGHT LOSS: ICD-10-CM

## 2019-12-04 DIAGNOSIS — F51.01 PRIMARY INSOMNIA: ICD-10-CM

## 2019-12-04 PROCEDURE — 36415 COLL VENOUS BLD VENIPUNCTURE: CPT

## 2019-12-04 PROCEDURE — 80053 COMPREHEN METABOLIC PANEL: CPT

## 2019-12-04 PROCEDURE — 85025 COMPLETE CBC W/AUTO DIFF WBC: CPT

## 2019-12-04 RX ORDER — ZOLPIDEM TARTRATE 5 MG/1
5 TABLET ORAL
Qty: 30 TAB | Refills: 3 | Status: SHIPPED | OUTPATIENT
Start: 2019-12-04 | End: 2020-01-29 | Stop reason: SDUPTHER

## 2019-12-04 RX ORDER — LIDOCAINE 50 MG/G
1 PATCH TOPICAL EVERY 24 HOURS
Qty: 30 EACH | Refills: 5 | Status: SHIPPED | OUTPATIENT
Start: 2019-12-04

## 2019-12-04 NOTE — PROGRESS NOTES
HISTORY OF PRESENT ILLNESS  Jorge Luis Sharp is a 75 y.o. female. HPI      f/u neck pain and insomnia and weight loss-here with   Still c/o pain all over but mostly in neck and upper back and shoulders  Prior recent xr-only mild arthritis on c spine and t spine  C/o sweats  ongoing weight loss 15 lbs this year despite good appetite  Pt says she is losing weight d/t lack of sleep  Not able to sleep well d.t upper back and neck pain  Awakes at 12 pm and takes ambien 5 mg hs which helps some with sleep   Never a smoker per pt  Saw RADHA MARTINEZ this week of intersitical cystitis hx and hematuria-may have had cysto--Dr Ryann Lynne. No results given yet per pt      Last OV  2 month f/u weight loss, fibromyalgia insomnia, neck pain  ambien prescribed for insomnia  xr neck and thoracic spine -facet arthritis and foraminal stenosis, stable T7 wedge deformity-- on prolia per rheum MD  PT referral-- went to PT 1 time   Not improved, maybe worse per pt  Takes ambien 5 mg hs now--wakes after 3 hrs so now just takes at around midnight when she wakes up. Does help some, needs refill     Colonoscopy 2013-negative, hemorrhoids     C/o pain , tinnitus right ear--x 1 month     Patient Active Problem List    Diagnosis Date Noted    Gallstones 02/03/2019    HLD (hyperlipidemia) 03/10/2014    Epigastric pain 11/07/2013    Blood in stool 11/07/2013    Constipation 11/07/2013    Occult blood positive stool 11/07/2013    Interstitial cystitis 02/24/2011    Asthma 02/12/2010    Fibromyalgia 02/12/2010    Osteoporosis 02/12/2010    Vitamin D deficiency 02/12/2010     Current Outpatient Medications   Medication Sig Dispense Refill    lidocaine (LIDODERM) 5 % 1 Patch by TransDERmal route every twenty-four (24) hours. Apply patch to the affected area for 12 hours a day 30 Each 5    zolpidem (AMBIEN) 5 mg tablet Take 1 Tab by mouth nightly as needed for Sleep.  Max Daily Amount: 5 mg. 30 Tab 3    calcium glycerophosphate (PRELIEF PO) Take by mouth.  mirtazapine (REMERON) 7.5 mg tablet Take 1 Tab by mouth nightly. Indications: Disorder characterized by Stiff, Tender & Painful Muscles 90 Tab 1    vit A,C,E-zinc-copper (ICAPS AREDS) cap capsule Take 1 Cap by mouth.  naproxen (NAPROSYN) 375 mg tablet Take 1 Tab by mouth two (2) times daily as needed. For neck or back pain 30 Tab 1    traMADol (ULTRAM) 50 mg tablet Take 1 Tab by mouth every six (6) hours as needed for Pain. Max Daily Amount: 200 mg. Indications: Pain 30 Tab 1    budesonide (PULMICORT FLEXHALER) 180 mcg/actuation aepb inhaler Take  by inhalation.  montelukast (SINGULAIR) 10 mg tablet TK 1 T PO IN THE MAGDIEL  5    Magnesium Oxide 500 mg cap Take 300 mg by mouth. 1 tab a day      CALCIUM CARBONATE/VITAMIN D3 (CALCIUM 600 + D PO) Take  by mouth.        No Known Allergies  Social History     Tobacco Use    Smoking status: Never Smoker    Smokeless tobacco: Never Used   Substance Use Topics    Alcohol use: No      Lab Results   Component Value Date/Time    WBC 4.6 02/04/2019 02:33 PM    HGB 12.5 02/04/2019 02:33 PM    HCT 38.5 02/04/2019 02:33 PM    PLATELET 893 36/92/7206 02:33 PM    MCV 96 02/04/2019 02:33 PM     Lab Results   Component Value Date/Time    Glucose 78 02/04/2019 02:33 PM    LDL, calculated 124 (H) 02/04/2019 02:33 PM    Creatinine (POC) 0.7 08/08/2018 10:08 AM    Creatinine 0.76 02/04/2019 02:33 PM      Lab Results   Component Value Date/Time    Cholesterol, total 222 (H) 02/04/2019 02:33 PM    HDL Cholesterol 77 02/04/2019 02:33 PM    LDL, calculated 124 (H) 02/04/2019 02:33 PM    Triglyceride 104 02/04/2019 02:33 PM     Lab Results   Component Value Date/Time    GFR est non-AA 78 02/04/2019 02:33 PM    GFRNA, POC >60 08/08/2018 10:08 AM    GFR est AA 89 02/04/2019 02:33 PM    GFRAA, POC >60 08/08/2018 10:08 AM    Creatinine 0.76 02/04/2019 02:33 PM    Creatinine (POC) 0.7 08/08/2018 10:08 AM    BUN 14 02/04/2019 02:33 PM    Sodium 146 (H) 02/04/2019 02:33 PM    Potassium 4.4 02/04/2019 02:33 PM    Chloride 106 02/04/2019 02:33 PM    CO2 19 (L) 02/04/2019 02:33 PM    PTH, Intact 50.6 02/12/2010 11:15 AM     Lab Results   Component Value Date/Time    TSH 2.730 02/04/2019 02:33 PM         ROS    Physical Exam  Vitals signs and nursing note reviewed. Constitutional:       Appearance: She is well-developed. Comments: Appears stated age   Neck:      Musculoskeletal: Normal range of motion. No neck rigidity or muscular tenderness. Cardiovascular:      Rate and Rhythm: Normal rate and regular rhythm. Heart sounds: Normal heart sounds. No murmur. No friction rub. No gallop. Pulmonary:      Effort: Pulmonary effort is normal. No respiratory distress. Breath sounds: Normal breath sounds. No wheezing. Abdominal:      General: Bowel sounds are normal.      Palpations: Abdomen is soft. Lymphadenopathy:      Cervical: No cervical adenopathy. Neurological:      Mental Status: She is alert. ASSESSMENT and PLAN  Diagnoses and all orders for this visit:    1. Bone pain  -     NM BONE SCAN [address] BODY; Future   lidoderm patch for back pain  2. Weight loss  -     METABOLIC PANEL, COMPREHENSIVE  -     CBC WITH AUTOMATED DIFF  -     XR CHEST PA LAT; Future   Try to increase caloric intake  3. Breast screening  -     [facility] MAMMO BI SCREENING INCL CAD; Future   Pt declines but I recommended again    4. Primary insomnia  -     zolpidem (AMBIEN) 5 mg tablet; Take 1 Tab by mouth nightly as needed for Sleep. Max Daily Amount: 5 mg. Other orders  -     lidocaine (LIDODERM) 5 %; 1 Patch by TransDERmal route every twenty-four (24) hours. Apply patch to the affected area for 12 hours a day      Follow-up and Dispositions    · Return in about 6 months (around 6/4/2020) for weight loss pain.

## 2019-12-04 NOTE — PROGRESS NOTES
Chief Complaint   Patient presents with    Skin Problem     place right side of nose x 1 month    Follow-up     3 month follow up neck pain    Weight Loss     3 month follow up

## 2019-12-04 NOTE — PATIENT INSTRUCTIONS
Office Policies Phone calls/patient messages: Please allow up to 24 hours for someone in the office to contact you about your call or message. Be mindful your provider may be out of the office or your message may require further review. We encourage you to use [employer] for your messages as this is a faster, more efficient way to communicate with our office Medication Refills: 
         
Prescription medications require 48-72 business hours to process. We encourage you to use [employer] for your refills. For controlled medications: Please allow 72 business hours to process. Certain medications may require you to  a written prescription at our office. NO narcotic/controlled medications will be prescribed after 4pm Monday through Friday or on weekends Form/Paperwork Completion: 
         
Please note a $25 fee may incur for all paperwork for completed by our providers. We ask that you allow 7-10 business days. Pre-payment is due prior to picking up/faxing the completed form. You may also download your forms to [employer] to have your doctor print off.

## 2019-12-05 LAB
ALBUMIN SERPL-MCNC: 4.5 G/DL (ref 3.5–4.8)
ALBUMIN/GLOB SERPL: 1.7 {RATIO} (ref 1.2–2.2)
ALP SERPL-CCNC: 40 IU/L (ref 39–117)
ALT SERPL-CCNC: 18 IU/L (ref 0–32)
AST SERPL-CCNC: 28 IU/L (ref 0–40)
BASOPHILS # BLD AUTO: 0 X10E3/UL (ref 0–0.2)
BASOPHILS NFR BLD AUTO: 1 %
BILIRUB SERPL-MCNC: 0.3 MG/DL (ref 0–1.2)
BUN SERPL-MCNC: 17 MG/DL (ref 8–27)
BUN/CREAT SERPL: 26 (ref 12–28)
CALCIUM SERPL-MCNC: 9.1 MG/DL (ref 8.7–10.3)
CHLORIDE SERPL-SCNC: 101 MMOL/L (ref 96–106)
CO2 SERPL-SCNC: 26 MMOL/L (ref 20–29)
CREAT SERPL-MCNC: 0.65 MG/DL (ref 0.57–1)
EOSINOPHIL # BLD AUTO: 0.1 X10E3/UL (ref 0–0.4)
EOSINOPHIL NFR BLD AUTO: 2 %
ERYTHROCYTE [DISTWIDTH] IN BLOOD BY AUTOMATED COUNT: 12.3 % (ref 12.3–15.4)
GLOBULIN SER CALC-MCNC: 2.6 G/DL (ref 1.5–4.5)
GLUCOSE SERPL-MCNC: 80 MG/DL (ref 65–99)
HCT VFR BLD AUTO: 37.8 % (ref 34–46.6)
HGB BLD-MCNC: 12.9 G/DL (ref 11.1–15.9)
IMM GRANULOCYTES # BLD AUTO: 0 X10E3/UL (ref 0–0.1)
IMM GRANULOCYTES NFR BLD AUTO: 0 %
LYMPHOCYTES # BLD AUTO: 1.8 X10E3/UL (ref 0.7–3.1)
LYMPHOCYTES NFR BLD AUTO: 39 %
MCH RBC QN AUTO: 31.9 PG (ref 26.6–33)
MCHC RBC AUTO-ENTMCNC: 34.1 G/DL (ref 31.5–35.7)
MCV RBC AUTO: 93 FL (ref 79–97)
MONOCYTES # BLD AUTO: 0.5 X10E3/UL (ref 0.1–0.9)
MONOCYTES NFR BLD AUTO: 10 %
NEUTROPHILS # BLD AUTO: 2.3 X10E3/UL (ref 1.4–7)
NEUTROPHILS NFR BLD AUTO: 48 %
PLATELET # BLD AUTO: 202 X10E3/UL (ref 150–450)
POTASSIUM SERPL-SCNC: 4.7 MMOL/L (ref 3.5–5.2)
PROT SERPL-MCNC: 7.1 G/DL (ref 6–8.5)
RBC # BLD AUTO: 4.05 X10E6/UL (ref 3.77–5.28)
SODIUM SERPL-SCNC: 141 MMOL/L (ref 134–144)
WBC # BLD AUTO: 4.7 X10E3/UL (ref 3.4–10.8)

## 2020-01-29 DIAGNOSIS — F51.01 PRIMARY INSOMNIA: ICD-10-CM

## 2020-01-29 RX ORDER — ZOLPIDEM TARTRATE 5 MG/1
5 TABLET ORAL
Qty: 30 TAB | Refills: 3 | Status: SHIPPED | OUTPATIENT
Start: 2020-01-29 | End: 2020-09-16

## 2020-02-06 ENCOUNTER — TELEPHONE (OUTPATIENT)
Dept: INTERNAL MEDICINE CLINIC | Age: 75
End: 2020-02-06

## 2020-02-06 NOTE — TELEPHONE ENCOUNTER
Called, spoke to pt. Two identifiers confirmed. Notified pt PA for [employer] was approved. Pt verbalized understanding of information discussed w/ no further questions at this time.

## 2020-02-26 ENCOUNTER — DOCUMENTATION ONLY (OUTPATIENT)
Dept: INTERNAL MEDICINE CLINIC | Age: 75
End: 2020-02-26

## 2020-02-26 NOTE — PROGRESS NOTES
Per patient's request, most recent labs from 12/4/2019 were faxed to [facility] to the attention of Dr. Royal Donaldson for his review prior to her appointment with him on 3/2/2020.

## 2020-03-08 ENCOUNTER — HOSPITAL ENCOUNTER (OUTPATIENT)
Dept: MRI IMAGING | Age: 75
Discharge: HOME | End: 2020-03-08
Attending: PAIN MEDICINE
Payer: MEDICARE

## 2020-03-08 DIAGNOSIS — M54.12 CERVICAL RADICULOPATHY: ICD-10-CM

## 2020-03-08 PROCEDURE — 72141 MRI NECK SPINE W/O DYE: CPT

## 2020-06-01 ENCOUNTER — TELEPHONE (OUTPATIENT)
Dept: INTERNAL MEDICINE CLINIC | Age: 75
End: 2020-06-01

## 2020-06-01 NOTE — TELEPHONE ENCOUNTER
Patient states she needs a call back to discuss her upcoming appt on 6/4/20 & is needs to be re-scheduled or what to do as she does not have \"Smartphone\" capability. Please call to advise what to do. Thank you      Patient states  also has appt same day.

## 2020-06-01 NOTE — TELEPHONE ENCOUNTER
Lm Crawford MD  You 27 minutes ago (2:41 PM)     Phone only is ok    Routing comment      Notified via voicemail.

## 2020-06-03 PROBLEM — F51.01 PRIMARY INSOMNIA: Status: ACTIVE | Noted: 2020-06-03

## 2020-06-03 NOTE — PROGRESS NOTES
HISTORY OF PRESENT ILLNESS  Sancho Villafuerte is a 75 y.o. female. HPI   VV via telephone encounter x 15 minutes  D/t covid 19 pandemic  --Creta Canavan, MD on 6/3/2020 at 6:48 PM    An electronic signature was used to authenticate this note. f/u neck pain and insomnia and weight loss-here with   Current weight  Weight was 89lbs at Dr. Baldo Flores office  Has declined mammograms-still declines mammogram  Had neck MRI 3/2020-mild central and foraminal stenosis --ordered by Dr Toy Mendez  Had thoracic trigger point injections which helped neck pain  Still has in her back and will f/u Dr Lucas Chen for OA and osteoporosis on prolia  On ambien 5 mg hs for insomnia which helps  Her weight is up a few lbs    C/o burning with urination x 1 month, cloudy urine. No blood    Last OV  Still c/o pain all over but mostly in neck and upper back and shoulders  Prior recent xr-only mild arthritis on c spine and t spine  C/o sweats  ongoing weight loss 15 lbs this year despite good appetite  Pt says she is losing weight d/t lack of sleep  Not able to sleep well d.t upper back and neck pain  Awakes at 12 pm and takes ambien 5 mg hs which helps some with sleep   Never a smoker per pt  Saw RADHA MARTINEZ this week of intersitical cystitis hx and hematuria-may have had cysto--Dr Katie Munoz. No results given yet per pt    Patient Active Problem List    Diagnosis Date Noted    Gallstones 02/03/2019    HLD (hyperlipidemia) 03/10/2014    Epigastric pain 11/07/2013    Blood in stool 11/07/2013    Constipation 11/07/2013    Occult blood positive stool 11/07/2013    Interstitial cystitis 02/24/2011    Asthma 02/12/2010    Fibromyalgia 02/12/2010    Osteoporosis 02/12/2010    Vitamin D deficiency 02/12/2010     Current Outpatient Medications   Medication Sig Dispense Refill    zolpidem (AMBIEN) 5 mg tablet Take 1 Tab by mouth nightly as needed for Sleep.  Max Daily Amount: 5 mg. 30 Tab 3    lidocaine (LIDODERM) 5 % 1 Patch by TransDERmal route every twenty-four (24) hours. Apply patch to the affected area for 12 hours a day 30 Each 5    calcium glycerophosphate (PRELIEF PO) Take  by mouth.  mirtazapine (REMERON) 7.5 mg tablet Take 1 Tab by mouth nightly. Indications: Disorder characterized by Stiff, Tender & Painful Muscles 90 Tab 1    vit A,C,E-zinc-copper (ICAPS AREDS) cap capsule Take 1 Cap by mouth.  naproxen (NAPROSYN) 375 mg tablet Take 1 Tab by mouth two (2) times daily as needed. For neck or back pain 30 Tab 1    traMADol (ULTRAM) 50 mg tablet Take 1 Tab by mouth every six (6) hours as needed for Pain. Max Daily Amount: 200 mg. Indications: Pain 30 Tab 1    budesonide (PULMICORT FLEXHALER) 180 mcg/actuation aepb inhaler Take  by inhalation.  montelukast (SINGULAIR) 10 mg tablet TK 1 T PO IN THE MAGDIEL  5    Magnesium Oxide 500 mg cap Take 300 mg by mouth. 1 tab a day      CALCIUM CARBONATE/VITAMIN D3 (CALCIUM 600 + D PO) Take  by mouth.        No Known Allergies   Lab Results   Component Value Date/Time    WBC 4.7 12/04/2019 01:20 PM    HGB 12.9 12/04/2019 01:20 PM    HCT 37.8 12/04/2019 01:20 PM    PLATELET 598 28/11/0870 01:20 PM    MCV 93 12/04/2019 01:20 PM     Lab Results   Component Value Date/Time    Glucose 80 12/04/2019 01:20 PM    LDL, calculated 124 (H) 02/04/2019 02:33 PM    Creatinine (POC) 0.7 08/08/2018 10:08 AM    Creatinine 0.65 12/04/2019 01:20 PM      Lab Results   Component Value Date/Time    Cholesterol, total 222 (H) 02/04/2019 02:33 PM    HDL Cholesterol 77 02/04/2019 02:33 PM    LDL, calculated 124 (H) 02/04/2019 02:33 PM    Triglyceride 104 02/04/2019 02:33 PM     Lab Results   Component Value Date/Time    GFR est non-AA 87 12/04/2019 01:20 PM    GFRNA, POC >60 08/08/2018 10:08 AM    GFR est  12/04/2019 01:20 PM    GFRAA, POC >60 08/08/2018 10:08 AM    Creatinine 0.65 12/04/2019 01:20 PM    Creatinine (POC) 0.7 08/08/2018 10:08 AM    BUN 17 12/04/2019 01:20 PM    Sodium 141 12/04/2019 01:20 PM Potassium 4.7 12/04/2019 01:20 PM    Chloride 101 12/04/2019 01:20 PM    CO2 26 12/04/2019 01:20 PM    PTH, Intact 50.6 02/12/2010 11:15 AM     Lab Results   Component Value Date/Time    TSH 2.730 02/04/2019 02:33 PM         ROS    Physical Exam  Constitutional:       Appearance: Normal appearance. Neurological:      General: No focal deficit present. Mental Status: She is alert. Psychiatric:         Mood and Affect: Mood normal.         Behavior: Behavior normal.         Thought Content: Thought content normal.         Judgment: Judgment normal.         ASSESSMENT and PLAN  Diagnoses and all orders for this visit:    1. Weight loss    2. Primary insomnia    3. Breast screening    4. Dysuria    Other orders  -     nitrofurantoin, macrocrystal-monohydrate, (MACROBID) 100 mg capsule; Take 1 Cap by mouth two (2) times a day. This is the Subsequent Medicare Annual Wellness Exam, performed 12 months or more after the Initial AWV or the last Subsequent AWV    Consent: Renaldo Hopkinst, who was seen by synchronous (real-time) audio only technology, and/or her healthcare decision maker, is aware that this patient-initiated, Telehealth encounter on 6/4/2020 is a billable service. While AWVs are fully covered by Medicare, any services rendered on this date that are not included in an AWV are subject to additional billing, with coverage as determined by her insurance carrier. She is aware that she may receive a bill for any such additional services and has provided verbal consent to proceed: Yes. I have reviewed the patient's medical history in detail and updated the computerized patient record.      History     Patient Active Problem List   Diagnosis Code    Asthma J45.909    Fibromyalgia M79.7    Osteoporosis M81.0    Vitamin D deficiency E55.9    Interstitial cystitis N30.10    Epigastric pain R10.13    Blood in stool K92.1    Constipation K59.00    Occult blood positive stool R19.5    HLD (hyperlipidemia) E78.5    Gallstones K80.20    Primary insomnia F51.01     Past Medical History:   Diagnosis Date    Asthma     Blood in stool 11/7/2013    Constipation 11/7/2013    Epigastric pain 11/7/2013    Occult blood positive stool 11/7/2013      Past Surgical History:   Procedure Laterality Date    COLONOSCOPY  3/19/08    HX HEENT Right     right eye cataract and glaucoma surgery     Current Outpatient Medications   Medication Sig Dispense Refill    nitrofurantoin, macrocrystal-monohydrate, (MACROBID) 100 mg capsule Take 1 Cap by mouth two (2) times a day. 14 Cap 0    zolpidem (AMBIEN) 5 mg tablet Take 1 Tab by mouth nightly as needed for Sleep. Max Daily Amount: 5 mg. 30 Tab 3    calcium glycerophosphate (PRELIEF PO) Take  by mouth.  vit A,C,E-zinc-copper (ICAPS AREDS) cap capsule Take 1 Cap by mouth.  naproxen (NAPROSYN) 375 mg tablet Take 1 Tab by mouth two (2) times daily as needed. For neck or back pain 30 Tab 1    budesonide (PULMICORT FLEXHALER) 180 mcg/actuation aepb inhaler Take  by inhalation.  Magnesium Oxide 500 mg cap Take 300 mg by mouth. 1 tab a day      CALCIUM CARBONATE/VITAMIN D3 (CALCIUM 600 + D PO) Take  by mouth.  lidocaine (LIDODERM) 5 % 1 Patch by TransDERmal route every twenty-four (24) hours. Apply patch to the affected area for 12 hours a day 30 Each 5    mirtazapine (REMERON) 7.5 mg tablet Take 1 Tab by mouth nightly. Indications: Disorder characterized by Stiff, Tender & Painful Muscles 90 Tab 1    traMADol (ULTRAM) 50 mg tablet Take 1 Tab by mouth every six (6) hours as needed for Pain. Max Daily Amount: 200 mg.  Indications: Pain 30 Tab 1    montelukast (SINGULAIR) 10 mg tablet TK 1 T PO IN THE MAGDIEL  5     No Known Allergies    Family History   Problem Relation Age of Onset    Cancer Mother         liver cancer    Stroke Father     No Known Problems Sister     No Known Problems Brother     No Known Problems Brother      Social History Tobacco Use    Smoking status: Never Smoker    Smokeless tobacco: Never Used   Substance Use Topics    Alcohol use: No       Depression Risk Factor Screening:     3 most recent PHQ Screens 12/4/2019   Little interest or pleasure in doing things Not at all   Feeling down, depressed, irritable, or hopeless Not at all   Total Score PHQ 2 0       Alcohol Risk Factor Screening:   Do you average 1 drink per night or more than 7 drinks a week:  No    On any one occasion in the past three months have you have had more than 3 drinks containing alcohol:  No      Functional Ability and Level of Safety:   Hearing: Hearing is good. Activities of Daily Living: The home contains: no safety equipment. Patient does total self care    Ambulation: with no difficulty    Fall Risk:  Fall Risk Assessment, last 12 mths 12/4/2019   Able to walk? Yes   Fall in past 12 months? No   Fall with injury? -   Number of falls in past 12 months -   Fall Risk Score -       Abuse Screen:  Patient is not abused    Cognitive Screening   Has your family/caregiver stated any concerns about your memory: no  Cognitive Screening: Normal - serial 3    Patient Care Team   Patient Care Team:  Glenroy Ochoa MD as PCP - Elsie Erazo MD as PCP - [facility]  Tanesha Grady MD (Gastroenterology)  Mari Tsang MD (Urology)  Monisha Mckeon MD (Otolaryngology)  Misty Connor MD (Gynecology)  Merlin Gardner, MD (Ophthalmology)  Teresa Moran MD as Physician (Internal Medicine)    Assessment/Plan   Education and counseling provided:  Are appropriate based on today's review and evaluation  Screening for glaucoma  tdap recommended    Diagnoses and all orders for this visit:    1. Weight loss   Weight up a few lbs   Encourage adequate caloric  intake  2. Primary insomnia   On ambien 5 mg hs  3. Breast screening   Declines mammogram   Advised monthly BSE  4.  Dysuria   Hx IC   Empiric macrobid  Other orders  -     nitrofurantoin, macrocrystal-monohydrate, (MACROBID) 100 mg capsule; Take 1 Cap by mouth two (2) times a day. Health Maintenance Due   Topic Date Due    DTaP/Tdap/Td series (1 - Tdap) 07/07/1965    GLAUCOMA SCREENING Q2Y  10/26/2018    Medicare Yearly Exam  02/06/2019       Lynne Montes De Oca is a 75 y.o. female who was evaluated by an audio only encounter for concerns as above. Patient identification was verified prior to start of the visit. A caregiver was present when appropriate. Due to this being a TeleHealth encounter (During MCFKW-00 public health emergency), evaluation of the following organ systems was limited: Vitals/Constitutional/EENT/Resp/CV/GI//MS/Neuro/Skin/Heme-Lymph-Imm. Pursuant to the emergency declaration under the [facility], 1135 waiver authority and the [employer] Act, this Virtual Visit was conducted, with patient's (and/or legal guardian's) consent, to reduce the patient's risk of exposure to COVID-19 and provide necessary medical care. Services were provided through a synchronous discussion virtually to substitute for in-person clinic visit. I was in the office. The patient was at home.       Rosales Alvarez MD

## 2020-06-04 ENCOUNTER — VIRTUAL VISIT (OUTPATIENT)
Dept: INTERNAL MEDICINE CLINIC | Age: 75
End: 2020-06-04

## 2020-06-04 DIAGNOSIS — Z12.39 BREAST SCREENING: ICD-10-CM

## 2020-06-04 DIAGNOSIS — R30.0 DYSURIA: ICD-10-CM

## 2020-06-04 DIAGNOSIS — F51.01 PRIMARY INSOMNIA: ICD-10-CM

## 2020-06-04 DIAGNOSIS — R63.4 WEIGHT LOSS: Primary | ICD-10-CM

## 2020-06-04 RX ORDER — NITROFURANTOIN 25; 75 MG/1; MG/1
100 CAPSULE ORAL 2 TIMES DAILY
Qty: 14 CAP | Refills: 0 | Status: SHIPPED | OUTPATIENT
Start: 2020-06-04 | End: 2021-05-06

## 2020-06-04 NOTE — PATIENT INSTRUCTIONS
Medicare Wellness Visit, Female The best way to live healthy is to have a lifestyle where you eat a well-balanced diet, exercise regularly, limit alcohol use, and quit all forms of tobacco/nicotine, if applicable. Regular preventive services are another way to keep healthy. Preventive services (vaccines, screening tests, monitoring & exams) can help personalize your care plan, which helps you manage your own care. Screening tests can find health problems at the earliest stages, when they are easiest to treat. [facility] follows the current, evidence-based guidelines published by the [facility] Rafi Banuelos ([facility]) when recommending preventive services for our patients. Because we follow these guidelines, sometimes recommendations change over time as research supports it. (For example, mammograms used to be recommended annually. Even though Medicare will still pay for an annual mammogram, the newer guidelines recommend a mammogram every two years for women of average risk). Of course, you and your doctor may decide to screen more often for some diseases, based on your risk and your co-morbidities (chronic disease you are already diagnosed with). Preventive services for you include: - Medicare offers their members a free annual wellness visit, which is time for you and your primary care provider to discuss and plan for your preventive service needs. Take advantage of this benefit every year! 
-All adults over the age of 72 should receive the recommended pneumonia vaccines. Current USPSTF guidelines recommend a series of two vaccines for the best pneumonia protection.  
-All adults should have a flu vaccine yearly and a tetanus vaccine every 10 years.  
-All adults age 48 and older should receive the shingles vaccines (series of two vaccines). -All adults age 38-68 who are overweight should have a diabetes screening test once every three years. -All adults born between [birth year] and 1966 should be screened once for Hepatitis C. 
-Other screening tests and preventive services for persons with diabetes include: an eye exam to screen for diabetic retinopathy, a kidney function test, a foot exam, and stricter control over your cholesterol.  
-Cardiovascular screening for adults with routine risk involves an electrocardiogram (ECG) at intervals determined by your doctor.  
-Colorectal cancer screenings should be done for adults age 54-65 with no increased risk factors for colorectal cancer. There are a number of acceptable methods of screening for this type of cancer. Each test has its own benefits and drawbacks. Discuss with your doctor what is most appropriate for you during your annual wellness visit. The different tests include: colonoscopy (considered the best screening method), a fecal occult blood test, a fecal DNA test, and sigmoidoscopy. 
 
-A bone mass density test is recommended when a woman turns 65 to screen for osteoporosis. This test is only recommended one time, as a screening. Some providers will use this same test as a disease monitoring tool if you already have osteoporosis. -Breast cancer screenings are recommended every other year for women of normal risk, age 54-69. 
-Cervical cancer screenings for women over age 72 are only recommended with certain risk factors. Here is a list of your current Health Maintenance items (your personalized list of preventive services) with a due date: 
Health Maintenance Due Topic Date Due  
 DTaP/Tdap/Td  (1 - Tdap) 07/07/1965  Glaucoma Screening   10/26/2018 Luan Griffin Annual Well Visit  02/06/2019

## 2020-10-15 DIAGNOSIS — H91.91 HEARING LOSS OF RIGHT EAR, UNSPECIFIED HEARING LOSS TYPE: ICD-10-CM

## 2020-10-15 DIAGNOSIS — H92.01 EAR PAIN, RIGHT: Primary | ICD-10-CM

## 2021-03-29 DIAGNOSIS — F51.01 PRIMARY INSOMNIA: ICD-10-CM

## 2021-03-29 RX ORDER — ZOLPIDEM TARTRATE 5 MG/1
TABLET ORAL
Qty: 30 TAB | Refills: 3 | Status: SHIPPED | OUTPATIENT
Start: 2021-03-29 | End: 2022-03-08 | Stop reason: ALTCHOICE

## 2021-03-29 NOTE — TELEPHONE ENCOUNTER
Future Appointments:  Future Appointments   Date Time Provider Tien Lubna   5/6/2021 10:30 AM Philippe Mayo MD [facility] AMB        Last Appointment With Me:  Visit date not found     Requested Prescriptions     Pending Prescriptions Disp Refills    zolpidem (AMBIEN) 5 mg tablet 30 Tab 3

## 2021-03-29 NOTE — TELEPHONE ENCOUNTER
Patient needs refill done thru [facility] in [address] indicated. Patient has upcoming appt on May 5th, 2021 with Dr. Melinda Domingo. Please call if any questions.  Thank you

## 2021-05-06 ENCOUNTER — OFFICE VISIT (OUTPATIENT)
Dept: INTERNAL MEDICINE CLINIC | Age: 76
End: 2021-05-06
Payer: MEDICARE

## 2021-05-06 VITALS
DIASTOLIC BLOOD PRESSURE: 78 MMHG | RESPIRATION RATE: 16 BRPM | HEART RATE: 87 BPM | TEMPERATURE: 97.3 F | BODY MASS INDEX: 18.26 KG/M2 | OXYGEN SATURATION: 99 % | WEIGHT: 93 LBS | HEIGHT: 60 IN | SYSTOLIC BLOOD PRESSURE: 162 MMHG

## 2021-05-06 DIAGNOSIS — Z11.59 ENCOUNTER FOR HEPATITIS C SCREENING TEST FOR LOW RISK PATIENT: ICD-10-CM

## 2021-05-06 DIAGNOSIS — E78.00 PURE HYPERCHOLESTEROLEMIA: ICD-10-CM

## 2021-05-06 DIAGNOSIS — G89.29 CHRONIC NECK PAIN: ICD-10-CM

## 2021-05-06 DIAGNOSIS — M54.2 CHRONIC NECK PAIN: ICD-10-CM

## 2021-05-06 DIAGNOSIS — Z00.00 MEDICARE ANNUAL WELLNESS VISIT, SUBSEQUENT: ICD-10-CM

## 2021-05-06 DIAGNOSIS — R63.4 WEIGHT LOSS: Primary | ICD-10-CM

## 2021-05-06 DIAGNOSIS — M81.0 OSTEOPOROSIS, UNSPECIFIED OSTEOPOROSIS TYPE, UNSPECIFIED PATHOLOGICAL FRACTURE PRESENCE: ICD-10-CM

## 2021-05-06 DIAGNOSIS — N30.10 INTERSTITIAL CYSTITIS: ICD-10-CM

## 2021-05-06 DIAGNOSIS — R03.0 BLOOD PRESSURE ELEVATED WITHOUT HISTORY OF HTN: ICD-10-CM

## 2021-05-06 DIAGNOSIS — R39.89 SENSATION OF PRESSURE IN BLADDER AREA: ICD-10-CM

## 2021-05-06 PROCEDURE — G8536 NO DOC ELDER MAL SCRN: HCPCS | Performed by: INTERNAL MEDICINE

## 2021-05-06 PROCEDURE — G8419 CALC BMI OUT NRM PARAM NOF/U: HCPCS | Performed by: INTERNAL MEDICINE

## 2021-05-06 PROCEDURE — G8510 SCR DEP NEG, NO PLAN REQD: HCPCS | Performed by: INTERNAL MEDICINE

## 2021-05-06 PROCEDURE — 1101F PT FALLS ASSESS-DOCD LE1/YR: CPT | Performed by: INTERNAL MEDICINE

## 2021-05-06 PROCEDURE — 1090F PRES/ABSN URINE INCON ASSESS: CPT | Performed by: INTERNAL MEDICINE

## 2021-05-06 PROCEDURE — G8427 DOCREV CUR MEDS BY ELIG CLIN: HCPCS | Performed by: INTERNAL MEDICINE

## 2021-05-06 PROCEDURE — 99214 OFFICE O/P EST MOD 30 MIN: CPT | Performed by: INTERNAL MEDICINE

## 2021-05-06 PROCEDURE — G0463 HOSPITAL OUTPT CLINIC VISIT: HCPCS | Performed by: INTERNAL MEDICINE

## 2021-05-06 PROCEDURE — G0439 PPPS, SUBSEQ VISIT: HCPCS | Performed by: INTERNAL MEDICINE

## 2021-05-06 RX ORDER — TIZANIDINE 2 MG/1
2 TABLET ORAL
Qty: 30 TAB | Refills: 1 | Status: SHIPPED | OUTPATIENT
Start: 2021-05-06 | End: 2021-05-13

## 2021-05-06 RX ORDER — NITROFURANTOIN 25; 75 MG/1; MG/1
100 CAPSULE ORAL 2 TIMES DAILY
Qty: 10 CAP | Refills: 0 | Status: SHIPPED | OUTPATIENT
Start: 2021-05-06 | End: 2022-03-08 | Stop reason: ALTCHOICE

## 2021-05-06 RX ORDER — CELECOXIB 100 MG/1
100 CAPSULE ORAL
Qty: 60 CAP | Refills: 2 | Status: SHIPPED | OUTPATIENT
Start: 2021-05-06 | End: 2021-05-06 | Stop reason: CLARIF

## 2021-05-06 RX ORDER — FLUTICASONE FUROATE 100 UG/1
1 POWDER RESPIRATORY (INHALATION) DAILY
COMMUNITY

## 2021-05-06 NOTE — PROGRESS NOTES
HISTORY OF PRESENT ILLNESS  Marie Ernandez is a 67 y.o. female. HPI    f/u chronic neck and back  Pain, osteoporosis on prolia and insomnia and weight loss-here with  and for medicare wellness---  Sees pain management Dr GARCIA for neck and back pain-trigger point injections  See urologist for IC  Has some bladder pressure sensation  Had colonoscopy 2013int hemorrhoids only  On ambien for insomnia    Weight up several lbs since last OV--eating the same per pt  --93 lbs  Has pain all over-wants rx for muscle pain  Does not want to get further injections from Dr Burgess Fells Dr Claudetta Prima for OA--naprosyn rx-cbc cmp done in Jan 2020  C/o urine frequency--hx IC . No pain or f/c    Has been taking care of  who has dementia and needs a lot of supervision  Last OV       Current weight  Weight was 89lbs at Dr. Ivonne Crocker office   Has declined mammograms-still declines mammogram  Had neck MRI 3/2020-mild central and foraminal stenosis --ordered by Dr Jose Elias Dalal  Had thoracic trigger point injections which helped neck pain  Still has in her back and will f/u Dr Burgess Fells Dr Frankey Hugh for OA and osteoporosis on prolia  On ambien 5 mg hs for insomnia which helps  Her weight is up a few lbs     C/o burning with urination x 1 month, cloudy urine. No blood    Patient Active Problem List    Diagnosis Date Noted    Primary insomnia 06/03/2020    Gallstones 02/03/2019    HLD (hyperlipidemia) 03/10/2014    Epigastric pain 11/07/2013    Blood in stool 11/07/2013    Constipation 11/07/2013    Occult blood positive stool 11/07/2013    Interstitial cystitis 02/24/2011    Asthma 02/12/2010    Fibromyalgia 02/12/2010    Osteoporosis 02/12/2010    Vitamin D deficiency 02/12/2010     Current Outpatient Medications   Medication Sig Dispense Refill    zolpidem (AMBIEN) 5 mg tablet TAKE 1 TABLET BY MOUTH NIGHTLY AS NEEDED FOR SLEEP .   DO  NOT  EXCEED  5  MG  PER  DAY 30 Tab 3    nitrofurantoin, macrocrystal-monohydrate, (MACROBID) 100 mg capsule Take 1 Cap by mouth two (2) times a day. 14 Cap 0    lidocaine (LIDODERM) 5 % 1 Patch by TransDERmal route every twenty-four (24) hours. Apply patch to the affected area for 12 hours a day 30 Each 5    calcium glycerophosphate (PRELIEF PO) Take  by mouth.  mirtazapine (REMERON) 7.5 mg tablet Take 1 Tab by mouth nightly. Indications: Disorder characterized by Stiff, Tender & Painful Muscles 90 Tab 1    vit A,C,E-zinc-copper (ICAPS AREDS) cap capsule Take 1 Cap by mouth.  naproxen (NAPROSYN) 375 mg tablet Take 1 Tab by mouth two (2) times daily as needed. For neck or back pain 30 Tab 1    traMADol (ULTRAM) 50 mg tablet Take 1 Tab by mouth every six (6) hours as needed for Pain. Max Daily Amount: 200 mg. Indications: Pain 30 Tab 1    budesonide (PULMICORT FLEXHALER) 180 mcg/actuation aepb inhaler Take  by inhalation.  montelukast (SINGULAIR) 10 mg tablet TK 1 T PO IN THE MAGDIEL  5    Magnesium Oxide 500 mg cap Take 300 mg by mouth. 1 tab a day      CALCIUM CARBONATE/VITAMIN D3 (CALCIUM 600 + D PO) Take  by mouth.        No Known Allergies   Lab Results   Component Value Date/Time    WBC 4.7 12/04/2019 01:20 PM    HGB 12.9 12/04/2019 01:20 PM    HCT 37.8 12/04/2019 01:20 PM    PLATELET 189 06/49/6473 01:20 PM    MCV 93 12/04/2019 01:20 PM     Lab Results   Component Value Date/Time    Glucose 80 12/04/2019 01:20 PM    LDL, calculated 124 (H) 02/04/2019 02:33 PM    Creatinine (POC) 0.7 08/08/2018 10:08 AM    Creatinine 0.65 12/04/2019 01:20 PM      Lab Results   Component Value Date/Time    Cholesterol, total 222 (H) 02/04/2019 02:33 PM    HDL Cholesterol 77 02/04/2019 02:33 PM    LDL, calculated 124 (H) 02/04/2019 02:33 PM    Triglyceride 104 02/04/2019 02:33 PM     Lab Results   Component Value Date/Time    GFR est non-AA 87 12/04/2019 01:20 PM    GFRNA, POC >60 08/08/2018 10:08 AM    GFR est  12/04/2019 01:20 PM    GFRAA, POC >60 08/08/2018 10:08 AM    Creatinine 0.65 12/04/2019 01:20 PM Creatinine (POC) 0.7 08/08/2018 10:08 AM    BUN 17 12/04/2019 01:20 PM    Sodium 141 12/04/2019 01:20 PM    Potassium 4.7 12/04/2019 01:20 PM    Chloride 101 12/04/2019 01:20 PM    CO2 26 12/04/2019 01:20 PM    PTH, Intact 50.6 02/12/2010 11:15 AM        ROS    Physical Exam  Vitals signs and nursing note reviewed. Constitutional:       Appearance: Normal appearance. She is well-developed. Comments: Appears stated age, thin underweight   Cardiovascular:      Rate and Rhythm: Normal rate and regular rhythm. Heart sounds: Normal heart sounds. No murmur. No friction rub. No gallop. Pulmonary:      Effort: Pulmonary effort is normal. No respiratory distress. Breath sounds: Normal breath sounds. No wheezing. Abdominal:      General: Bowel sounds are normal.      Palpations: Abdomen is soft. Neurological:      Mental Status: She is alert. ASSESSMENT and PLAN  Diagnoses and all orders for this visit:    1. Weight loss    2. Encounter for hepatitis C screening test for low risk patient    3. Chronic neck pain    4. Osteoporosis, unspecified osteoporosis type, unspecified pathological fracture presence    5. Pure hypercholesterolemia    6. Interstitial cystitis    7. Sensation of pressure in bladder area    Other orders  -     nitrofurantoin, macrocrystal-monohydrate, (MACROBID) 100 mg capsule; Take 1 Cap by mouth two (2) times a day. -     tiZANidine (ZANAFLEX) 2 mg tablet; Take 1 Tab by mouth two (2) times daily as needed for Muscle Spasm(s). This is the Subsequent Medicare Annual Wellness Exam, performed 12 months or more after the Initial AWV or the last Subsequent AWV    I have reviewed the patient's medical history in detail and updated the computerized patient record. Assessment/Plan   Education and counseling provided:  Are appropriate based on today's review and evaluation  tdap recommended    1. Weight loss--stablilized  2.  Encounter for hepatitis C screening test for low risk patient  3. Chronic neck pain-can take naprosyn prn. New rx for zanaflex  4. Osteoporosis, unspecified osteoporosis type, unspecified pathological fracture presence-on prolia-Dr Lindsay  5. Pure hypercholesterolemia-manage with diet and exercise  6. Interstitial cystitis-tonie GARCIA MD  7. Sensation of pressure in bladder area--macrobid rx  8. Blood pressure elevated--pt will monitor and let us know if > 140/90 at home    Depression Risk Factor Screening     3 most recent PHQ Screens 5/6/2021   Little interest or pleasure in doing things Not at all   Feeling down, depressed, irritable, or hopeless Not at all   Total Score PHQ 2 0       Alcohol Risk Screen    Do you average more than 1 drink per night or more than 7 drinks a week:  No    On any one occasion in the past three months have you have had more than 3 drinks containing alcohol:  No        Functional Ability and Level of Safety    Hearing: The patient needs further evaluation. Activities of Daily Living: The home contains: no safety equipment. Patient does total self care      Ambulation: with no difficulty     Fall Risk:  Fall Risk Assessment, last 12 mths 5/6/2021   Able to walk? Yes   Fall in past 12 months? 1   Do you feel unsteady? 0   Are you worried about falling 0   Is TUG test greater than 12 seconds? 0   Is the gait abnormal? 0   Number of falls in past 12 months 1   Fall with injury?  0      Abuse Screen:  Patient is not abused       Cognitive Screening    Has your family/caregiver stated any concerns about your memory: no     Cognitive Screening: Normal - serial 3    Health Maintenance Due     Health Maintenance Due   Topic Date Due    Hepatitis C Screening  Never done    DTaP/Tdap/Td series (1 - Tdap) Never done    Medicare Yearly Exam  06/05/2021       Patient Care Team   Patient Care Team:  Kade Archuleta MD as PCP - Alicia Cobb MD as PCP - [facility] Empaneled Provider  Kodak Levi MD (Gastroenterology)  Wilene Denver, MD (Urology)  Peewee Avalos MD (Otolaryngology)  Isabel Basurto MD (Gynecology)  Wilver Forde MD (Ophthalmology)  Dara Lama MD as Physician (Internal Medicine)    History     Patient Active Problem List   Diagnosis Code    Asthma J45.909    Fibromyalgia M79.7    Osteoporosis M81.0    Vitamin D deficiency E55.9    Interstitial cystitis N30.10    Epigastric pain R10.13    Blood in stool K92.1    Constipation K59.00    Occult blood positive stool R19.5    HLD (hyperlipidemia) E78.5    Gallstones K80.20    Primary insomnia F51.01     Past Medical History:   Diagnosis Date    Asthma     Blood in stool 11/7/2013    Constipation 11/7/2013    Epigastric pain 11/7/2013    Occult blood positive stool 11/7/2013      Past Surgical History:   Procedure Laterality Date    COLONOSCOPY  3/19/08    HX HEENT Right     right eye cataract and glaucoma surgery     Current Outpatient Medications   Medication Sig Dispense Refill    fluticasone furoate (Arnuity Ellipta) 100 mcg/actuation dsdv inhaler Take 1 Puff by inhalation daily.  nitrofurantoin, macrocrystal-monohydrate, (MACROBID) 100 mg capsule Take 1 Cap by mouth two (2) times a day. 10 Cap 0    tiZANidine (ZANAFLEX) 2 mg tablet Take 1 Tab by mouth two (2) times daily as needed for Muscle Spasm(s). 30 Tab 1    zolpidem (AMBIEN) 5 mg tablet TAKE 1 TABLET BY MOUTH NIGHTLY AS NEEDED FOR SLEEP . DO  NOT  EXCEED  5  MG  PER  DAY 30 Tab 3    calcium glycerophosphate (PRELIEF PO) Take  by mouth.  vit A,C,E-zinc-copper (ICAPS AREDS) cap capsule Take 1 Cap by mouth.  CALCIUM CARBONATE/VITAMIN D3 (CALCIUM 600 + D PO) Take  by mouth.  lidocaine (LIDODERM) 5 % 1 Patch by TransDERmal route every twenty-four (24) hours. Apply patch to the affected area for 12 hours a day 30 Each 5    mirtazapine (REMERON) 7.5 mg tablet Take 1 Tab by mouth nightly.  Indications: Disorder characterized by Stiff, Tender & Painful Muscles 90 Tab 1    traMADol (ULTRAM) 50 mg tablet Take 1 Tab by mouth every six (6) hours as needed for Pain. Max Daily Amount: 200 mg. Indications: Pain 30 Tab 1    budesonide (PULMICORT FLEXHALER) 180 mcg/actuation aepb inhaler Take  by inhalation.  montelukast (SINGULAIR) 10 mg tablet TK 1 T PO IN THE MAGDIEL  5    Magnesium Oxide 500 mg cap Take 300 mg by mouth.  1 tab a day       No Known Allergies    Family History   Problem Relation Age of Onset    Cancer Mother         liver cancer    Stroke Father     No Known Problems Sister     No Known Problems Brother     No Known Problems Brother      Social History     Tobacco Use    Smoking status: Never Smoker    Smokeless tobacco: Never Used   Substance Use Topics    Alcohol use: No         Abril Gallo MD

## 2021-05-06 NOTE — PATIENT INSTRUCTIONS
Office Policies Phone calls/patient messages: Please allow up to 24 hours for someone in the office to contact you about your call or message. Be mindful your provider may be out of the office or your message may require further review. We encourage you to use [employer] for your messages as this is a faster, more efficient way to communicate with our office Medication Refills: 
         
Prescription medications require 48-72 business hours to process. We encourage you to use [employer] for your refills. For controlled medications: Please allow 72 business hours to process. Certain medications may require you to  a written prescription at our office. NO narcotic/controlled medications will be prescribed after 4pm Monday through Friday or on weekends Form/Paperwork Completion: 
         
Please note a $25 fee may incur for all paperwork for completed by our providers. We ask that you allow 7-10 business days. Pre-payment is due prior to picking up/faxing the completed form. You may also download your forms to [employer] to have your doctor print off. Medicare Wellness Visit, Female The best way to live healthy is to have a lifestyle where you eat a well-balanced diet, exercise regularly, limit alcohol use, and quit all forms of tobacco/nicotine, if applicable. Regular preventive services are another way to keep healthy. Preventive services (vaccines, screening tests, monitoring & exams) can help personalize your care plan, which helps you manage your own care. Screening tests can find health problems at the earliest stages, when they are easiest to treat. [facility] follows the current, evidence-based guidelines published by the [facility] (USPSTF) when recommending preventive services for our patients.  Because we follow these guidelines, sometimes recommendations change over time as research supports it. (For example, mammograms used to be recommended annually. Even though Medicare will still pay for an annual mammogram, the newer guidelines recommend a mammogram every two years for women of average risk). Of course, you and your doctor may decide to screen more often for some diseases, based on your risk and your co-morbidities (chronic disease you are already diagnosed with). Preventive services for you include: - Medicare offers their members a free annual wellness visit, which is time for you and your primary care provider to discuss and plan for your preventive service needs. Take advantage of this benefit every year! 
-All adults over the age of 72 should receive the recommended pneumonia vaccines. Current USPSTF guidelines recommend a series of two vaccines for the best pneumonia protection.  
-All adults should have a flu vaccine yearly and a tetanus vaccine every 10 years.  
-All adults age 48 and older should receive the shingles vaccines (series of two vaccines). -All adults age 38-68 who are overweight should have a diabetes screening test once every three years.  
-All adults born between [birth year] and 1966 should be screened once for Hepatitis C. 
-Other screening tests and preventive services for persons with diabetes include: an eye exam to screen for diabetic retinopathy, a kidney function test, a foot exam, and stricter control over your cholesterol.  
-Cardiovascular screening for adults with routine risk involves an electrocardiogram (ECG) at intervals determined by your doctor.  
-Colorectal cancer screenings should be done for adults age 54-65 with no increased risk factors for colorectal cancer. There are a number of acceptable methods of screening for this type of cancer. Each test has its own benefits and drawbacks. Discuss with your doctor what is most appropriate for you during your annual wellness visit.  The different tests include: colonoscopy (considered the best screening method), a fecal occult blood test, a fecal DNA test, and sigmoidoscopy. 
 
-A bone mass density test is recommended when a woman turns 65 to screen for osteoporosis. This test is only recommended one time, as a screening. Some providers will use this same test as a disease monitoring tool if you already have osteoporosis. -Breast cancer screenings are recommended every other year for women of normal risk, age 54-69. 
-Cervical cancer screenings for women over age 72 are only recommended with certain risk factors. Here is a list of your current Health Maintenance items (your personalized list of preventive services) with a due date: 
Health Maintenance Due Topic Date Due  
 Hepatitis C Test  Never done  DTaP/Tdap/Td  (1 - Tdap) Never done [facility] Annual Well Visit  06/05/2021

## 2021-05-13 RX ORDER — TIZANIDINE 2 MG/1
2 TABLET ORAL
Qty: 30 TAB | Refills: 1 | Status: SHIPPED | OUTPATIENT
Start: 2021-05-13 | End: 2021-06-14

## 2021-06-14 RX ORDER — TIZANIDINE 2 MG/1
2 TABLET ORAL
Qty: 30 TABLET | Refills: 1 | Status: SHIPPED | OUTPATIENT
Start: 2021-06-14 | End: 2021-07-01

## 2021-07-01 RX ORDER — TIZANIDINE 2 MG/1
2 TABLET ORAL
Qty: 30 TABLET | Refills: 1 | Status: SHIPPED | OUTPATIENT
Start: 2021-07-01 | End: 2021-07-14

## 2021-07-14 RX ORDER — TIZANIDINE 2 MG/1
2 TABLET ORAL
Qty: 30 TABLET | Refills: 1 | Status: SHIPPED | OUTPATIENT
Start: 2021-07-14 | End: 2021-08-16

## 2021-08-16 RX ORDER — TIZANIDINE 2 MG/1
2 TABLET ORAL
Qty: 30 TABLET | Refills: 1 | Status: SHIPPED | OUTPATIENT
Start: 2021-08-16 | End: 2021-08-31

## 2021-08-31 RX ORDER — TIZANIDINE 2 MG/1
2 TABLET ORAL
Qty: 30 TABLET | Refills: 1 | Status: SHIPPED | OUTPATIENT
Start: 2021-08-31 | End: 2021-09-14

## 2021-09-14 RX ORDER — TIZANIDINE 2 MG/1
2 TABLET ORAL
Qty: 30 TABLET | Refills: 1 | Status: SHIPPED | OUTPATIENT
Start: 2021-09-14 | End: 2021-09-27

## 2021-09-27 RX ORDER — TIZANIDINE 2 MG/1
2 TABLET ORAL
Qty: 30 TABLET | Refills: 1 | Status: SHIPPED | OUTPATIENT
Start: 2021-09-27 | End: 2021-10-14

## 2021-10-14 RX ORDER — TIZANIDINE 2 MG/1
2 TABLET ORAL
Qty: 30 TABLET | Refills: 1 | Status: SHIPPED | OUTPATIENT
Start: 2021-10-14 | End: 2021-11-01

## 2021-11-01 RX ORDER — TIZANIDINE 2 MG/1
2 TABLET ORAL
Qty: 30 TABLET | Refills: 1 | Status: SHIPPED | OUTPATIENT
Start: 2021-11-01 | End: 2021-11-14

## 2021-11-14 RX ORDER — TIZANIDINE 2 MG/1
2 TABLET ORAL
Qty: 30 TABLET | Refills: 1 | Status: SHIPPED | OUTPATIENT
Start: 2021-11-14 | End: 2021-11-29

## 2021-11-29 RX ORDER — TIZANIDINE 2 MG/1
2 TABLET ORAL
Qty: 30 TABLET | Refills: 1 | Status: SHIPPED | OUTPATIENT
Start: 2021-11-29 | End: 2022-03-08 | Stop reason: ALTCHOICE

## 2022-01-18 ENCOUNTER — TELEPHONE (OUTPATIENT)
Dept: INTERNAL MEDICINE CLINIC | Age: 77
End: 2022-01-18

## 2022-01-18 NOTE — TELEPHONE ENCOUNTER
Called and spoke with patients daughter, 2 identifiers confirmed. Patient due for AWV in May, appointments rescheduled for May 12, 2022 9:15am.  Appointment confirmed, no further questions.

## 2022-01-18 NOTE — TELEPHONE ENCOUNTER
----- Message from Annamarie Cruz sent at 1/18/2022  2:30 PM EST -----  Subject: Message to Provider    QUESTIONS  Information for Provider? Pt's daughter is wanting to verify that the appt   on 3/11 is covered by the insurance. Please give them a call. Name is   Stan Dean. Phone number is [phone]  ---------------------------------------------------------------------------  --------------  CALL BACK INFO  What is the best way for the office to contact you? OK to leave message on   voicemail  Preferred Call Back Phone Number? [phone]  ---------------------------------------------------------------------------  --------------  SCRIPT ANSWERS  Relationship to Patient? Other  Representative Name? Stan Lopez-Daughter  Is the Representative on the appropriate HIPAA document in Epic?  Yes

## 2022-03-05 ENCOUNTER — OFFICE VISIT (OUTPATIENT)
Dept: URGENT CARE | Age: 77
End: 2022-03-05
Payer: MEDICARE

## 2022-03-05 VITALS
HEART RATE: 96 BPM | DIASTOLIC BLOOD PRESSURE: 80 MMHG | RESPIRATION RATE: 16 BRPM | BODY MASS INDEX: 18.36 KG/M2 | OXYGEN SATURATION: 96 % | TEMPERATURE: 97.9 F | SYSTOLIC BLOOD PRESSURE: 133 MMHG | WEIGHT: 94 LBS

## 2022-03-05 DIAGNOSIS — R21 RASH: Primary | ICD-10-CM

## 2022-03-05 PROCEDURE — 99213 OFFICE O/P EST LOW 20 MIN: CPT | Performed by: NURSE PRACTITIONER

## 2022-03-05 PROCEDURE — G8419 CALC BMI OUT NRM PARAM NOF/U: HCPCS | Performed by: NURSE PRACTITIONER

## 2022-03-05 PROCEDURE — G8536 NO DOC ELDER MAL SCRN: HCPCS | Performed by: NURSE PRACTITIONER

## 2022-03-05 PROCEDURE — G8432 DEP SCR NOT DOC, RNG: HCPCS | Performed by: NURSE PRACTITIONER

## 2022-03-05 PROCEDURE — 1090F PRES/ABSN URINE INCON ASSESS: CPT | Performed by: NURSE PRACTITIONER

## 2022-03-05 PROCEDURE — G8428 CUR MEDS NOT DOCUMENT: HCPCS | Performed by: NURSE PRACTITIONER

## 2022-03-05 PROCEDURE — 1101F PT FALLS ASSESS-DOCD LE1/YR: CPT | Performed by: NURSE PRACTITIONER

## 2022-03-05 RX ORDER — PREDNISONE 20 MG/1
20 TABLET ORAL
Qty: 5 TABLET | Refills: 0 | Status: SHIPPED | OUTPATIENT
Start: 2022-03-05 | End: 2022-03-08 | Stop reason: ALTCHOICE

## 2022-03-05 RX ORDER — HYDROCORTISONE 1 %
CREAM (GRAM) TOPICAL 2 TIMES DAILY
COMMUNITY

## 2022-03-05 RX ORDER — MINERAL OIL
180 ENEMA (ML) RECTAL
COMMUNITY

## 2022-03-05 RX ORDER — DIPHENHYDRAMINE HCL 25 MG
25 CAPSULE ORAL
COMMUNITY

## 2022-03-05 NOTE — PATIENT INSTRUCTIONS
Follow up with your primary care provider this week. Go to the Emergency Department with development of any acute symptoms. Rash: Care Instructions  Your Care Instructions  A rash is any irritation or inflammation of the skin. Rashes have many possible causes, including allergy, infection, illness, heat, and emotional stress. Follow-up care is a key part of your treatment and safety. Be sure to make and go to all appointments, and call your doctor if you are having problems. It's also a good idea to know your test results and keep a list of the medicines you take. How can you care for yourself at home? · Wash the area with water only. Soap can make dryness and itching worse. Pat dry. · Put cold, wet cloths on the rash to reduce itching. · Keep cool, and stay out of the sun. · Leave the rash open to the air as much of the time as possible. · Sometimes petroleum jelly (Vaseline) can help relieve the discomfort caused by a rash. A moisturizing lotion, such as Cetaphil, also may help. Calamine lotion may help for rashes caused by contact with something (such as a plant or soap) that irritated the skin. Use it 3 or 4 times a day. · If your doctor prescribed a cream, use it as directed. If your doctor prescribed medicine, take it exactly as directed. · If your rash itches so badly that it interferes with your normal activities, take an over-the-counter antihistamine, such as diphenhydramine (Benadryl) or loratadine (Claritin). Read and follow all instructions on the label. When should you call for help? Call your doctor now or seek immediate medical care if:    · You have signs of infection, such as:  ? Increased pain, swelling, warmth, or redness. ? Red streaks leading from the area. ? Pus draining from the area. ? A fever.     · You have joint pain along with the rash. Watch closely for changes in your health, and be sure to contact your doctor if:    · Your rash is changing or getting worse.  For example, call if you have pain along with the rash, the rash is spreading, or you have new blisters.     · You do not get better after 1 week. Where can you learn more? Go to http://www.gray.com/  Enter U711 in the search box to learn more about \"Rash: Care Instructions. \"  Current as of: March 3, 2021               Content Version: 13.0  © 2006-2021 [employer]. Care instructions adapted under license by [employer] (which disclaims liability or warranty for this information). If you have questions about a medical condition or this instruction, always ask your healthcare professional. [place] [address] any warranty or liability for your use of this information.

## 2022-03-05 NOTE — PROGRESS NOTES
Denis Barber is a 67 y.o. female presenting to clinic with a rash to the ears, neck and forearms that began on 3/3/22. Patient denies any new soaps, medications, detergents, or foods. She states that she has been taking care of her son's dog since two days prior to onset of the rash and wonders if perhaps that is related. States that the rash is itchy and that it burns. Reports that she has been taking 25mg benadryl every 6 hours and using hydrocortisone cream without relief. Denies any areas that are draining. Denies fevers or chills. No other complaints today. The history is provided by the patient. History limited by: nothing.    Rash          Past Medical History:   Diagnosis Date    Asthma     Blood in stool 11/7/2013    Constipation 11/7/2013    Epigastric pain 11/7/2013    Occult blood positive stool 11/7/2013        Past Surgical History:   Procedure Laterality Date    COLONOSCOPY  3/19/08    HX HEENT Right     right eye cataract and glaucoma surgery         Family History   Problem Relation Age of Onset    Cancer Mother         liver cancer    Stroke Father     No Known Problems Sister     No Known Problems Brother     No Known Problems Brother         Social History     Socioeconomic History    Marital status:      Spouse name: Not on file    Number of children: Not on file    Years of education: Not on file    Highest education level: Not on file   Occupational History    Not on file   Tobacco Use    Smoking status: Never Smoker    Smokeless tobacco: Never Used   Vaping Use    Vaping Use: Never used   Substance and Sexual Activity    Alcohol use: No    Drug use: Yes     Types: Prescription, OTC    Sexual activity: Yes     Partners: Male   Other Topics Concern    Not on file   Social History Narrative    Not on file     Social Determinants of Health     Financial Resource Strain:     Difficulty of Paying Living Expenses: Not on file   Food Insecurity:     Worried About Running Out of Food in the Last Year: Not on file    Ran Out of Food in the Last Year: Not on file   Transportation Needs:     Lack of Transportation (Medical): Not on file    Lack of Transportation (Non-Medical): Not on file   Physical Activity:     Days of Exercise per Week: Not on file    Minutes of Exercise per Session: Not on file   Stress:     Feeling of Stress : Not on file   Social Connections:     Frequency of Communication with Friends and Family: Not on file    Frequency of Social Gatherings with Friends and Family: Not on file    Attends Taoist Services: Not on file    Active Member of [address] [place] [address] [employer] or Organizations: Not on file    Attends Club or Organization Meetings: Not on file    Marital Status: Not on file   Intimate Partner Violence:     Fear of Current or Ex-Partner: Not on file    Emotionally Abused: Not on file    Physically Abused: Not on file    Sexually Abused: Not on file   Housing Stability:     Unable to Pay for Housing in the Last Year: Not on file    Number of [place] in the Last Year: Not on file    Unstable Housing in the Last Year: Not on file                ALLERGIES: Other plant, animal, environmental    Review of Systems   Constitutional: Negative for chills and fever. HENT: Negative for congestion, rhinorrhea, sneezing and sore throat. Respiratory: Negative for cough, chest tightness, shortness of breath and wheezing. Cardiovascular: Negative for chest pain. Gastrointestinal: Negative for abdominal pain, nausea and vomiting. Skin: Positive for rash. Vitals:    03/05/22 1441   BP: 133/80   Pulse: 96   Resp: 16   Temp: 97.9 °F (36.6 °C)   SpO2: 96%   Weight: 94 lb (42.6 kg)       Physical Exam  Vitals and nursing note reviewed. Constitutional:       General: She is not in acute distress. Appearance: Normal appearance. She is not ill-appearing, toxic-appearing or diaphoretic. HENT:      Head: Normocephalic and atraumatic.       Right Ear: Tympanic membrane and ear canal normal.      Left Ear: Tympanic membrane and ear canal normal.      Ears:      Comments: External ear thickened and erythematous. Non-tender. Eyes:      Extraocular Movements: Extraocular movements intact. Conjunctiva/sclera: Conjunctivae normal.   Cardiovascular:      Rate and Rhythm: Normal rate. Pulmonary:      Effort: Pulmonary effort is normal. No respiratory distress. Comments: Speaking in complete sentences without difficulty. Skin:     General: Skin is warm and dry. Comments: BL ear lobes erythematous, dry, and feel thickened. Not tender. Erythematous, papular rash to posterior neck. Appears dry. Erythematous, macular rash to volar aspect of forearms  No signs of infection. No drainage or excoriation, no areas of cellulitis. Neurological:      General: No focal deficit present. Mental Status: She is alert. Psychiatric:         Mood and Affect: Mood normal.         Behavior: Behavior normal.         MDM  PLAN:  Patient presents to clinic today with a rash to the ears, posterior lower neck, and to the forearms x2 days. Reports that she has been taking care of a dog this past week and wonders if she could have been exposed to some allergen. 1. Rx prednisone burst.  2. Follow up with PCP this week. 3. Go to ED with development of any acute symptoms. DIAGNOSIS:    ICD-10-CM ICD-9-CM    1. Rash  R21 782.1      Medications Ordered Today   Medications    predniSONE (DELTASONE) 20 mg tablet     Sig: Take 20 mg by mouth daily (with breakfast) for 5 days.      Dispense:  5 Tablet     Refill:  0       Procedures

## 2022-03-07 ENCOUNTER — NURSE TRIAGE (OUTPATIENT)
Dept: OTHER | Facility: CLINIC | Age: 77
End: 2022-03-07

## 2022-03-07 NOTE — TELEPHONE ENCOUNTER
Received call from [address] [facility] at [facility] with Red Flag Complaint. MARIELENA Guillen [address], [address] [facility], is on the call. She disconnected the line, writer called back to get another  on the line. [phone] Sam Goltz, on the phone now. Subjective: Caller states \"last Wednesday, had itchiness on left ear, went to right ear, she was seen in [facility] this weekend and was given medication, she is trying to follow up, and medication is not working. \"     Current Symptoms: burning and itchy in ears, neck, hands, head    Onset: 6 days ago; worsening    Associated Symptoms: NA    Pain Severity: 0/10; n/a; n/a    Temperature: denies fever n/a    What has been tried: what she was given at [facility]    LMP: NA Pregnant: NA    Recommended disposition: See in Office Today, was told walk in/[facility] if no appointments. Care advice provided, patient verbalizes understanding; denies any other questions or concerns; instructed to call back for any new or worsening symptoms. Patient/Caller agrees with recommended disposition; writer provided warm transfer to chat at [facility] for appointment scheduling    Attention Provider: Thank you for allowing me to participate in the care of your patient. The patient was connected to triage in response to information provided to the [facility]. Please do not respond through this encounter as the response is not directed to a shared pool.       Reason for Disposition   MODERATE-SEVERE widespread itching (i.e., interferes with sleep, normal activities or school) and not improved after 24 hours of itching Care Advice    Protocols used: ITCHING [facility], [facility]

## 2022-03-08 ENCOUNTER — OFFICE VISIT (OUTPATIENT)
Dept: INTERNAL MEDICINE CLINIC | Age: 77
End: 2022-03-08
Payer: MEDICARE

## 2022-03-08 VITALS
HEART RATE: 86 BPM | TEMPERATURE: 97.6 F | WEIGHT: 97.8 LBS | DIASTOLIC BLOOD PRESSURE: 60 MMHG | BODY MASS INDEX: 19.2 KG/M2 | RESPIRATION RATE: 15 BRPM | HEIGHT: 60 IN | OXYGEN SATURATION: 97 % | SYSTOLIC BLOOD PRESSURE: 120 MMHG

## 2022-03-08 DIAGNOSIS — L25.9 CONTACT DERMATITIS, UNSPECIFIED CONTACT DERMATITIS TYPE, UNSPECIFIED TRIGGER: Primary | ICD-10-CM

## 2022-03-08 PROCEDURE — G8432 DEP SCR NOT DOC, RNG: HCPCS | Performed by: INTERNAL MEDICINE

## 2022-03-08 PROCEDURE — 1101F PT FALLS ASSESS-DOCD LE1/YR: CPT | Performed by: INTERNAL MEDICINE

## 2022-03-08 PROCEDURE — G0463 HOSPITAL OUTPT CLINIC VISIT: HCPCS | Performed by: INTERNAL MEDICINE

## 2022-03-08 PROCEDURE — G8536 NO DOC ELDER MAL SCRN: HCPCS | Performed by: INTERNAL MEDICINE

## 2022-03-08 PROCEDURE — G8427 DOCREV CUR MEDS BY ELIG CLIN: HCPCS | Performed by: INTERNAL MEDICINE

## 2022-03-08 PROCEDURE — 99213 OFFICE O/P EST LOW 20 MIN: CPT | Performed by: INTERNAL MEDICINE

## 2022-03-08 PROCEDURE — G8420 CALC BMI NORM PARAMETERS: HCPCS | Performed by: INTERNAL MEDICINE

## 2022-03-08 PROCEDURE — 1090F PRES/ABSN URINE INCON ASSESS: CPT | Performed by: INTERNAL MEDICINE

## 2022-03-08 RX ORDER — PREDNISONE 10 MG/1
10 TABLET ORAL
Qty: 30 TABLET | Refills: 0 | Status: SHIPPED | OUTPATIENT
Start: 2022-03-08 | End: 2022-03-08 | Stop reason: CLARIF

## 2022-03-08 RX ORDER — PREDNISONE 10 MG/1
10 TABLET ORAL
Qty: 30 TABLET | Refills: 0 | Status: SHIPPED | OUTPATIENT
Start: 2022-03-08 | End: 2022-05-13

## 2022-03-08 NOTE — PATIENT INSTRUCTIONS
Office Policies    Phone calls/patient messages:            Please allow up to 24 hours for someone in the office to contact you about your call or message. Be mindful your provider may be out of the office or your message may require further review. We encourage you to use [employer] for your messages as this is a faster, more efficient way to communicate with our office                         Medication Refills:            Prescription medications require 48-72 business hours to process. We encourage you to use [employer] for your refills. For controlled medications: Please allow 72 business hours to process. Certain medications may require you to  a written prescription at our office. NO narcotic/controlled medications will be prescribed after 4pm Monday through Friday or on weekends              Form/Paperwork Completion:            Please note a $25 fee may incur for all paperwork for completed by our providers. We ask that you allow 7-10 business days. Pre-payment is due prior to picking up/faxing the completed form. You may also download your forms to [employer] to have your doctor print off.

## 2022-03-08 NOTE — PROGRESS NOTES
HISTORY OF PRESENT ILLNESS  Jonathan Adjutant is a 67 y.o. female. HPI     Hx chronic neck and back  Pain, osteoporosis on prolia and insomnia and weight loss-here with    Acute care OV for itchy rash x 1 week-started on left ear lobe, spread to neck, right ear , upper chest and forearms in exposed skin surfaces  Went to  3d ago and took prednsione 20 mg every day for 1 -2 d then increased to 40 mg every day yesterday  Improving on pred 40 mg dose but out of medication  Was taking care of someones dog last few days  Lips feel itchy now but not swollen      Patient Active Problem List    Diagnosis Date Noted    Primary insomnia 06/03/2020    Gallstones 02/03/2019    HLD (hyperlipidemia) 03/10/2014    Epigastric pain 11/07/2013    Blood in stool 11/07/2013    Constipation 11/07/2013    Occult blood positive stool 11/07/2013    Interstitial cystitis 02/24/2011    Asthma 02/12/2010    Fibromyalgia 02/12/2010    Osteoporosis 02/12/2010    Vitamin D deficiency 02/12/2010     Current Outpatient Medications   Medication Sig Dispense Refill    fexofenadine (ALLEGRA) 180 mg tablet Take 180 mg by mouth daily as needed for Allergies.  hydrocortisone (CORTAID) 1 % topical cream Apply  to affected area two (2) times a day. use thin layer      diphenhydrAMINE (Wal-Dryl Allergy) 25 mg capsule Take 25 mg by mouth every six (6) hours as needed.  fluticasone furoate (Arnuity Ellipta) 100 mcg/actuation dsdv inhaler Take 1 Puff by inhalation daily.  lidocaine (LIDODERM) 5 % 1 Patch by TransDERmal route every twenty-four (24) hours. Apply patch to the affected area for 12 hours a day 30 Each 5    calcium glycerophosphate (PRELIEF PO) Take  by mouth.  vit A,C,E-zinc-copper (ICAPS AREDS) cap capsule Take 1 Cap by mouth.  Magnesium Oxide 500 mg cap Take 300 mg by mouth. 1 tab a day      CALCIUM CARBONATE/VITAMIN D3 (CALCIUM 600 + D PO) Take  by mouth.       mirtazapine (REMERON) 7.5 mg tablet Take 1 Tab by mouth nightly. Indications: Disorder characterized by Stiff, Tender & Painful Muscles (Patient not taking: Reported on 3/8/2022) 90 Tab 1    budesonide (PULMICORT FLEXHALER) 180 mcg/actuation aepb inhaler Take  by inhalation. (Patient not taking: Reported on 3/5/2022)      montelukast (SINGULAIR) 10 mg tablet TK 1 T PO IN THE MAGDIEL (Patient not taking: Reported on 3/5/2022)  5     Allergies   Allergen Reactions    Other Plant, Animal, Environmental Unknown (comments)     animals      Lab Results   Component Value Date/Time    WBC 4.7 12/04/2019 01:20 PM    HGB 12.9 12/04/2019 01:20 PM    HCT 37.8 12/04/2019 01:20 PM    PLATELET 724 25/36/9765 01:20 PM    MCV 93 12/04/2019 01:20 PM     Lab Results   Component Value Date/Time    Glucose 80 12/04/2019 01:20 PM    LDL, calculated 124 (H) 02/04/2019 02:33 PM    Creatinine (POC) 0.7 08/08/2018 10:08 AM    Creatinine 0.65 12/04/2019 01:20 PM      Lab Results   Component Value Date/Time    Cholesterol, total 222 (H) 02/04/2019 02:33 PM    HDL Cholesterol 77 02/04/2019 02:33 PM    LDL, calculated 124 (H) 02/04/2019 02:33 PM    Triglyceride 104 02/04/2019 02:33 PM     Lab Results   Component Value Date/Time    GFR est non-AA 87 12/04/2019 01:20 PM    GFRNA, POC >60 08/08/2018 10:08 AM    GFR est  12/04/2019 01:20 PM    GFRAA, POC >60 08/08/2018 10:08 AM    Creatinine 0.65 12/04/2019 01:20 PM    Creatinine (POC) 0.7 08/08/2018 10:08 AM    BUN 17 12/04/2019 01:20 PM    Sodium 141 12/04/2019 01:20 PM    Potassium 4.7 12/04/2019 01:20 PM    Chloride 101 12/04/2019 01:20 PM    CO2 26 12/04/2019 01:20 PM    PTH, Intact 50.6 02/12/2010 11:15 AM        ROS    Physical Exam  Vitals and nursing note reviewed. Constitutional:       Appearance: She is well-developed. Comments: Appears stated age   Cardiovascular:      Rate and Rhythm: Normal rate and regular rhythm. Heart sounds: Normal heart sounds. No murmur heard. No friction rub. No gallop. Pulmonary:      Effort: Pulmonary effort is normal. No respiratory distress. Breath sounds: Normal breath sounds. No wheezing. Abdominal:      General: Bowel sounds are normal.      Palpations: Abdomen is soft. Skin:         Neurological:      Mental Status: She is alert. ASSESSMENT and PLAN  Diagnoses and all orders for this visit:    1. Contact dermatitis, unspecified contact dermatitis type, unspecified trigger    Prednisone taper over 12 days   Allegra every day   To call if not improved  Other orders  -     predniSONE (DELTASONE) 10 mg tablet; Take 10 mg by mouth daily (with breakfast).  4 every day x 3days, 3 every day x 3days, 2 every day x 3days then 1 every day x 3 days

## 2022-03-17 ENCOUNTER — TELEPHONE (OUTPATIENT)
Dept: INTERNAL MEDICINE CLINIC | Age: 77
End: 2022-03-17

## 2022-03-17 RX ORDER — PREDNISONE 10 MG/1
10 TABLET ORAL
Qty: 18 TABLET | Refills: 0 | Status: SHIPPED | OUTPATIENT
Start: 2022-03-17 | End: 2022-05-13

## 2022-03-17 NOTE — TELEPHONE ENCOUNTER
----- Message from Kavon Goran sent at 3/17/2022  9:38 AM EDT -----  Subject: Message to Provider    QUESTIONS  Information for Provider? Patient calling to advise medicine did not   work. . still has rash on neck and face. Seen  On March 8th, 2022. Need   medicine. Requesting a callback today.   ---------------------------------------------------------------------------  --------------  CALL BACK INFO  What is the best way for the office to contact you? OK to leave message on   voicemail  Preferred Call Back Phone Number? [phone]  ---------------------------------------------------------------------------  --------------  SCRIPT ANSWERS  Relationship to Patient?  Self

## 2022-03-19 PROBLEM — K80.20 GALLSTONES: Status: ACTIVE | Noted: 2019-02-03

## 2022-03-19 PROBLEM — F51.01 PRIMARY INSOMNIA: Status: ACTIVE | Noted: 2020-06-03

## 2022-03-29 ENCOUNTER — TELEPHONE (OUTPATIENT)
Dept: INTERNAL MEDICINE CLINIC | Age: 77
End: 2022-03-29

## 2022-03-29 DIAGNOSIS — L25.9 CONTACT DERMATITIS, UNSPECIFIED CONTACT DERMATITIS TYPE, UNSPECIFIED TRIGGER: Primary | ICD-10-CM

## 2022-03-29 NOTE — TELEPHONE ENCOUNTER
----- Message from Xiomara Tucker sent at 3/29/2022  9:08 AM EDT -----  Subject: Message to Provider    QUESTIONS  Information for Provider? Patient is still not feeling well, allergies,   itching rash on her body needs advise on what to do please return the   call.  ---------------------------------------------------------------------------  --------------  CALL BACK INFO  What is the best way for the office to contact you? OK to leave message on   voicemail  Preferred Call Back Phone Number? [phone]  ---------------------------------------------------------------------------  --------------  SCRIPT ANSWERS  Relationship to Patient?  Self

## 2022-03-30 ENCOUNTER — TELEPHONE (OUTPATIENT)
Dept: INTERNAL MEDICINE CLINIC | Age: 77
End: 2022-03-30

## 2022-03-30 DIAGNOSIS — R21 RASH: Primary | ICD-10-CM

## 2022-03-30 DIAGNOSIS — L25.9 CONTACT DERMATITIS, UNSPECIFIED CONTACT DERMATITIS TYPE, UNSPECIFIED TRIGGER: Primary | ICD-10-CM

## 2022-03-30 RX ORDER — PREDNISONE 10 MG/1
10 TABLET ORAL
Qty: 30 TABLET | Refills: 0 | Status: SHIPPED | OUTPATIENT
Start: 2022-03-30 | End: 2022-05-13

## 2022-03-30 NOTE — TELEPHONE ENCOUNTER
#[phone]  Pt states she is still waiting on a call back from yesterday and it was very important she states. Please call as soon as possible today.

## 2022-03-30 NOTE — TELEPHONE ENCOUNTER
Called patient. ID verified with Name and . Spoke with patient in regards to message received. Patient states that she was recently seen for issue with itching all over/rash. Patient states that she was prescribed Prednisone at that time. Patient states that it did help but, once completed, the rash comes back and she is itching all over again. Patient would like to know what the next steps are in regards to her itching at this time.

## 2022-03-30 NOTE — TELEPHONE ENCOUNTER
Called patient. ID verified with Name and . Spoke with patient in regards to message received. Writer contacted [employer] to see if they would be able to see patient sooner. Office was unable to see patient sooner. Referral placed for [facility]. Office had availability for . Patient decided to keep appt with [facility] for . Provider states that he would put in order for another steroid tabby and requested that patient complete labs that have been placed at this time. Patient states that she understands the information received and has no further questions or concerns at this time.

## 2022-03-30 NOTE — TELEPHONE ENCOUNTER
Called patient. ID verified with Name and . Informed patient that provider would like for patient to be seen by dermatology at this time. Informed patient, per provider, to stop by our office for labs. Writer provided patient with contact information for [employer] at this time. Patient states that she understands the information received and has no further questions or concerns at this time.

## 2022-03-30 NOTE — TELEPHONE ENCOUNTER
----- Message from Arvin Nurys sent at 3/30/2022 12:45 PM EDT -----  Subject: Message to Provider    QUESTIONS  Information for Provider? Patient is calling in regards of referral , she   is not able to be seen until may . Patient is asking if she can speak to   someone in regards so different referral or , help with sooner appointment   for dermatology Please reach out to patient in this regards   ---------------------------------------------------------------------------  --------------  [address]  What is the best way for the office to contact you? OK to leave message on   voicemail  Preferred Call Back Phone Number? [phone]  ---------------------------------------------------------------------------  --------------  SCRIPT ANSWERS  Relationship to Patient?  Self

## 2022-05-13 ENCOUNTER — OFFICE VISIT (OUTPATIENT)
Dept: INTERNAL MEDICINE CLINIC | Age: 77
End: 2022-05-13
Payer: MEDICARE

## 2022-05-13 VITALS
DIASTOLIC BLOOD PRESSURE: 60 MMHG | BODY MASS INDEX: 18.65 KG/M2 | TEMPERATURE: 98.3 F | HEART RATE: 83 BPM | HEIGHT: 60 IN | WEIGHT: 95 LBS | OXYGEN SATURATION: 97 % | SYSTOLIC BLOOD PRESSURE: 110 MMHG | RESPIRATION RATE: 16 BRPM

## 2022-05-13 DIAGNOSIS — M54.2 CHRONIC NECK PAIN: ICD-10-CM

## 2022-05-13 DIAGNOSIS — M81.0 AGE RELATED OSTEOPOROSIS, UNSPECIFIED PATHOLOGICAL FRACTURE PRESENCE: ICD-10-CM

## 2022-05-13 DIAGNOSIS — Z11.59 ENCOUNTER FOR HEPATITIS C SCREENING TEST FOR LOW RISK PATIENT: ICD-10-CM

## 2022-05-13 DIAGNOSIS — G89.29 CHRONIC NECK PAIN: ICD-10-CM

## 2022-05-13 DIAGNOSIS — E78.00 PURE HYPERCHOLESTEROLEMIA: Primary | ICD-10-CM

## 2022-05-13 DIAGNOSIS — Z00.00 MEDICARE ANNUAL WELLNESS VISIT, SUBSEQUENT: ICD-10-CM

## 2022-05-13 DIAGNOSIS — R63.4 WEIGHT LOSS: ICD-10-CM

## 2022-05-13 DIAGNOSIS — M79.10 MUSCLE PAIN: ICD-10-CM

## 2022-05-13 PROCEDURE — G8536 NO DOC ELDER MAL SCRN: HCPCS | Performed by: INTERNAL MEDICINE

## 2022-05-13 PROCEDURE — G0439 PPPS, SUBSEQ VISIT: HCPCS | Performed by: INTERNAL MEDICINE

## 2022-05-13 PROCEDURE — 1090F PRES/ABSN URINE INCON ASSESS: CPT | Performed by: INTERNAL MEDICINE

## 2022-05-13 PROCEDURE — G8420 CALC BMI NORM PARAMETERS: HCPCS | Performed by: INTERNAL MEDICINE

## 2022-05-13 PROCEDURE — 99213 OFFICE O/P EST LOW 20 MIN: CPT | Performed by: INTERNAL MEDICINE

## 2022-05-13 PROCEDURE — 1101F PT FALLS ASSESS-DOCD LE1/YR: CPT | Performed by: INTERNAL MEDICINE

## 2022-05-13 PROCEDURE — G0463 HOSPITAL OUTPT CLINIC VISIT: HCPCS | Performed by: INTERNAL MEDICINE

## 2022-05-13 PROCEDURE — G8510 SCR DEP NEG, NO PLAN REQD: HCPCS | Performed by: INTERNAL MEDICINE

## 2022-05-13 PROCEDURE — G8427 DOCREV CUR MEDS BY ELIG CLIN: HCPCS | Performed by: INTERNAL MEDICINE

## 2022-05-13 RX ORDER — NACL/NAHCO3/HYALURON SOD/ALOE 0.9 %
GEL (GRAM) NASAL
COMMUNITY

## 2022-05-13 RX ORDER — ZOLPIDEM TARTRATE 5 MG/1
5 TABLET ORAL
Qty: 90 TABLET | Refills: 1 | Status: SHIPPED | OUTPATIENT
Start: 2022-05-13

## 2022-05-13 RX ORDER — TIZANIDINE 4 MG/1
4 TABLET ORAL 2 TIMES DAILY
Qty: 60 TABLET | Refills: 3 | Status: SHIPPED | OUTPATIENT
Start: 2022-05-13 | End: 2022-07-25

## 2022-05-13 NOTE — PROGRESS NOTES
HISTORY OF PRESENT ILLNESS  Mayito Serrato is a 67 y.o. female. HPI   f/u chronic neck and back  Pain, mild HLD, osteoporosis on prolia and insomnia and weight loss-here with  and for medicare wellness---  Sees Dr Christie Gaffney for osteoporosis on prolia-appt in 1-2 months  Has pain in shoulder elbows hands neck pain-etc  Weight ---stable  Insomnia-not sleeping well only 2 hrs on ambien 5 mg hs -takes prn only  Neck and back pain--c/o ringling and soreness of all her muscles  C/o dry throat  Pt has declined mammograms for several years-  Son lives in [place] now  No longer see Dr GARCIA  Not having bladder pain issues now  Last OV     Sees pain management Dr GARCIA for neck and back pain-trigger point injections  See urologist for IC  Has some bladder pressure sensation  Had colonoscopy 2013int hemorrhoids only  On ambien for insomnia     Weight up several lbs since last OV--eating the same per pt  --93 lbs  Has pain all over-wants rx for muscle pain  Does not want to get further injections from Dr Michelle Hart for OA--naprosyn rx-cbc cmp done in Jan 2020  C/o urine frequency--hx IC . No pain or f/c     Has been taking care of  who has dementia and needs a lot of supervision    Patient Active Problem List    Diagnosis Date Noted    Primary insomnia 06/03/2020    Gallstones 02/03/2019    HLD (hyperlipidemia) 03/10/2014    Epigastric pain 11/07/2013    Blood in stool 11/07/2013    Constipation 11/07/2013    Occult blood positive stool 11/07/2013    Interstitial cystitis 02/24/2011    Asthma 02/12/2010    Fibromyalgia 02/12/2010    Osteoporosis 02/12/2010    Vitamin D deficiency 02/12/2010     Current Outpatient Medications   Medication Sig Dispense Refill    predniSONE (DELTASONE) 10 mg tablet Take 10 mg by mouth daily (with breakfast).  4 every day x 3d, 3 every day x 3d, 2 every day x 3d then 1 every day x 3d 30 Tablet 0    predniSONE (DELTASONE) 10 mg tablet Take 10 mg by mouth daily (with breakfast). 3 every day x 3d, 2 d x 3d then 1 every day x 3d 18 Tablet 0    predniSONE (DELTASONE) 10 mg tablet Take 10 mg by mouth daily (with breakfast). 4 every day x 3days, 3 every day x 3days, 2 every day x 3days then 1 every day x 3 days 30 Tablet 0    fexofenadine (ALLEGRA) 180 mg tablet Take 180 mg by mouth daily as needed for Allergies.  hydrocortisone (CORTAID) 1 % topical cream Apply  to affected area two (2) times a day. use thin layer      diphenhydrAMINE (Wal-Dryl Allergy) 25 mg capsule Take 25 mg by mouth every six (6) hours as needed.  fluticasone furoate (Arnuity Ellipta) 100 mcg/actuation dsdv inhaler Take 1 Puff by inhalation daily.  lidocaine (LIDODERM) 5 % 1 Patch by TransDERmal route every twenty-four (24) hours. Apply patch to the affected area for 12 hours a day 30 Each 5    calcium glycerophosphate (PRELIEF PO) Take  by mouth.  mirtazapine (REMERON) 7.5 mg tablet Take 1 Tab by mouth nightly. Indications: Disorder characterized by Stiff, Tender & Painful Muscles (Patient not taking: Reported on 3/8/2022) 90 Tab 1    vit A,C,E-zinc-copper (ICAPS AREDS) cap capsule Take 1 Cap by mouth.  budesonide (PULMICORT FLEXHALER) 180 mcg/actuation aepb inhaler Take  by inhalation. (Patient not taking: Reported on 3/5/2022)      montelukast (SINGULAIR) 10 mg tablet TK 1 T PO IN THE MAGDIEL (Patient not taking: Reported on 3/5/2022)  5    Magnesium Oxide 500 mg cap Take 300 mg by mouth. 1 tab a day      CALCIUM CARBONATE/VITAMIN D3 (CALCIUM 600 + D PO) Take  by mouth.        Allergies   Allergen Reactions    Other Plant, Animal, Environmental Unknown (comments)     animals      Lab Results   Component Value Date/Time    WBC 4.7 12/04/2019 01:20 PM    HGB 12.9 12/04/2019 01:20 PM    HCT 37.8 12/04/2019 01:20 PM    PLATELET 823 66/47/0269 01:20 PM    MCV 93 12/04/2019 01:20 PM     Lab Results   Component Value Date/Time    Glucose 80 12/04/2019 01:20 PM    LDL, calculated 124 (H) 02/04/2019 02:33 PM    Creatinine (POC) 0.7 08/08/2018 10:08 AM    Creatinine 0.65 12/04/2019 01:20 PM      Lab Results   Component Value Date/Time    Cholesterol, total 222 (H) 02/04/2019 02:33 PM    HDL Cholesterol 77 02/04/2019 02:33 PM    LDL, calculated 124 (H) 02/04/2019 02:33 PM    Triglyceride 104 02/04/2019 02:33 PM     Lab Results   Component Value Date/Time    GFR est non-AA 87 12/04/2019 01:20 PM    GFRNA, POC >60 08/08/2018 10:08 AM    GFR est  12/04/2019 01:20 PM    GFRAA, POC >60 08/08/2018 10:08 AM    Creatinine 0.65 12/04/2019 01:20 PM    Creatinine (POC) 0.7 08/08/2018 10:08 AM    BUN 17 12/04/2019 01:20 PM    Sodium 141 12/04/2019 01:20 PM    Potassium 4.7 12/04/2019 01:20 PM    Chloride 101 12/04/2019 01:20 PM    CO2 26 12/04/2019 01:20 PM    PTH, Intact 50.6 02/12/2010 11:15 AM     Lab Results   Component Value Date/Time    TSH 2.730 02/04/2019 02:33 PM      Lab Results   Component Value Date/Time    Glucose 80 12/04/2019 01:20 PM         ROS    Physical Exam  Vitals and nursing note reviewed. Constitutional:       Appearance: She is well-developed. Comments: Appears stated age   HENT:      Head: Normocephalic. Right Ear: Tympanic membrane normal.      Left Ear: Tympanic membrane normal.   Cardiovascular:      Rate and Rhythm: Normal rate and regular rhythm. Heart sounds: Normal heart sounds. No murmur heard. No friction rub. No gallop. Pulmonary:      Effort: Pulmonary effort is normal. No respiratory distress. Breath sounds: Normal breath sounds. No wheezing. Abdominal:      General: Bowel sounds are normal.      Palpations: Abdomen is soft. Neurological:      Mental Status: She is alert. ASSESSMENT and PLAN  Diagnoses and all orders for this visit:    1. Pure hypercholesterolemia    2. Chronic neck pain    3. Weight loss    4. Encounter for hepatitis C screening test for low risk patient    5.  Age related osteoporosis, unspecified pathological fracture presence    6. Muscle pain           This is the Subsequent Medicare Annual Wellness Exam, performed 12 months or more after the Initial AWV or the last Subsequent AWV    I have reviewed the patient's medical history in detail and updated the computerized patient record. Assessment/Plan   Education and counseling provided:  Are appropriate based on today's review and evaluation  tdap recommended    1. Pure hypercholesterolemia-lipid panel-manages with diet and exercise  2. Chronic neck pain-zanaflex 4 mg bid prn. Injections did not help-only mild OA /foraminal stenosis  3. Weight loss-stable  4. Encounter for hepatitis C screening test for low risk patient-hep c ab  5. Age related osteoporosis, unspecified pathological fracture presence-on prolia per Dr Paul Borja  6. Muscle pain-increase zanaflex to 4 mg bid prn  7. Insomnia--ambien 5-10 mg hs prn       Depression Risk Factor Screening     3 most recent PHQ Screens 5/13/2022   Little interest or pleasure in doing things Several days   Feeling down, depressed, irritable, or hopeless Several days   Total Score PHQ 2 2       Alcohol & Drug Abuse Risk Screen    Do you average more than 1 drink per night or more than 7 drinks a week:  No    On any one occasion in the past three months have you have had more than 3 drinks containing alcohol:  No          Functional Ability and Level of Safety    Hearing: Hearing is good. Activities of Daily Living: The home contains: no safety equipment. Patient does total self care      Ambulation: with no difficulty     Fall Risk:  Fall Risk Assessment, last 12 mths 5/13/2022   Able to walk? Yes   Fall in past 12 months? 0   Do you feel unsteady? 0   Are you worried about falling 0   Is TUG test greater than 12 seconds? -   Is the gait abnormal? -   Number of falls in past 12 months -   Fall with injury?  -      Abuse Screen:  Patient is not abused       Cognitive Screening    Has your family/caregiver stated any concerns about your memory: no     Cognitive Screening: Normal - Verbal Fluency Test    Health Maintenance Due     Health Maintenance Due   Topic Date Due    Hepatitis C Screening  Never done    DTaP/Tdap/Td series (1 - Tdap) Never done    Medicare Yearly Exam  05/07/2022       Patient Care Team   Patient Care Team:  Víctor Reeyz MD as PCP - Navi Miller MD as PCP - [facility] Empaneled Provider  Neville Medina MD (Gastroenterology)  Juan Miguel Rodrigues MD (Urology)  Sadia Francois MD (Inactive) (Otolaryngology)  Mitchell Lucas MD (Gynecology)  Humera Pacheco MD (Ophthalmology)  Geno Correa MD as Physician (Internal Medicine Physician)    History     Patient Active Problem List   Diagnosis Code    Asthma J45.909    Fibromyalgia M79.7    Osteoporosis M81.0    Vitamin D deficiency E55.9    Interstitial cystitis N30.10    Epigastric pain R10.13    Blood in stool K92.1    Constipation K59.00    Occult blood positive stool R19.5    HLD (hyperlipidemia) E78.5    Gallstones K80.20    Primary insomnia F51.01     Past Medical History:   Diagnosis Date    Asthma     Blood in stool 11/7/2013    Constipation 11/7/2013    Epigastric pain 11/7/2013    Occult blood positive stool 11/7/2013      Past Surgical History:   Procedure Laterality Date    COLONOSCOPY  3/19/08    HX HEENT Right     right eye cataract and glaucoma surgery     Current Outpatient Medications   Medication Sig Dispense Refill    NaCl-NaHCO3-hyaluron sod-aloe (Nasogel) 0.9 % gel by Nasal route.  triamcinolone acetonide (NASACORT ALLERGY NA) by Nasal route.  fluticasone furoate (Arnuity Ellipta) 100 mcg/actuation dsdv inhaler Take 1 Puff by inhalation daily.  calcium glycerophosphate (PRELIEF PO) Take  by mouth.  vit A,C,E-zinc-copper (ICAPS AREDS) cap capsule Take 1 Cap by mouth.  Magnesium Oxide 500 mg cap Take 300 mg by mouth.  1 tab a day      CALCIUM CARBONATE/VITAMIN D3 (CALCIUM 600 + D PO) Take  by mouth.      fexofenadine (ALLEGRA) 180 mg tablet Take 180 mg by mouth daily as needed for Allergies. (Patient not taking: Reported on 5/13/2022)      hydrocortisone (CORTAID) 1 % topical cream Apply  to affected area two (2) times a day. use thin layer      diphenhydrAMINE (Wal-Dryl Allergy) 25 mg capsule Take 25 mg by mouth every six (6) hours as needed. (Patient not taking: Reported on 5/13/2022)      lidocaine (LIDODERM) 5 % 1 Patch by TransDERmal route every twenty-four (24) hours. Apply patch to the affected area for 12 hours a day (Patient not taking: Reported on 5/13/2022) 30 Each 5    mirtazapine (REMERON) 7.5 mg tablet Take 1 Tab by mouth nightly. Indications: Disorder characterized by Stiff, Tender & Painful Muscles (Patient not taking: Reported on 3/8/2022) 90 Tab 1    budesonide (PULMICORT FLEXHALER) 180 mcg/actuation aepb inhaler Take  by inhalation.  (Patient not taking: Reported on 3/5/2022)      montelukast (SINGULAIR) 10 mg tablet TK 1 T PO IN THE MAGDIEL (Patient not taking: Reported on 3/5/2022)  5     Allergies   Allergen Reactions    Other Plant, Animal, Environmental Unknown (comments)     animals       Family History   Problem Relation Age of Onset    Cancer Mother         liver cancer    Stroke Father     No Known Problems Sister     No Known Problems Brother     No Known Problems Brother      Social History     Tobacco Use    Smoking status: Never Smoker    Smokeless tobacco: Never Used   Substance Use Topics    Alcohol use: No         Keena Bennett MD

## 2022-05-14 LAB
CHOLEST SERPL-MCNC: 277 MG/DL
HCV AB SERPL QL IA: NONREACTIVE
HDLC SERPL-MCNC: 104 MG/DL
HDLC SERPL: 2.7 {RATIO} (ref 0–5)
LDLC SERPL CALC-MCNC: 155.2 MG/DL (ref 0–100)
TRIGL SERPL-MCNC: 89 MG/DL (ref ?–150)
TSH SERPL DL<=0.05 MIU/L-ACNC: 1.7 UIU/ML (ref 0.36–3.74)
VLDLC SERPL CALC-MCNC: 17.8 MG/DL

## 2022-05-16 NOTE — PROGRESS NOTES
Tell pt -nornal thyroid level, mild cholesterol elevation but HDL remains very high no choleserol medication not required.  Hep C negative

## 2022-05-17 NOTE — PROGRESS NOTES
Called patient. ID verified with Name and . Spoke with patient in regards to recent lab results. Informed patient, per provider, \"Tell pt -nornal thyroid level, mild cholesterol elevation but HDL remains very high no choleserol medication not required. Hep C negative\"    Patient states that she understands the information received and has no further questions or concerns at this time.

## 2022-07-25 RX ORDER — TIZANIDINE 4 MG/1
TABLET ORAL
Qty: 180 TABLET | Refills: 1 | Status: SHIPPED | OUTPATIENT
Start: 2022-07-25

## 2022-12-15 ENCOUNTER — TELEPHONE (OUTPATIENT)
Dept: INTERNAL MEDICINE CLINIC | Age: 77
End: 2022-12-15

## 2022-12-15 NOTE — TELEPHONE ENCOUNTER
Received notice from [facility] ([facility]) that patient was calling office but disconnected call     Messages left for both Patient and Patient son to call office back

## 2022-12-15 NOTE — TELEPHONE ENCOUNTER
Please advise:    Pt needs paperwork filled out in   regards to her medial directive, she updated her states trusts.

## 2022-12-27 ENCOUNTER — DOCUMENTATION ONLY (OUTPATIENT)
Dept: INTERNAL MEDICINE CLINIC | Age: 77
End: 2022-12-27

## 2022-12-27 NOTE — PROGRESS NOTES
Patient presents to the office to drop off Medical Records for Stevenson Lieberman MD.     Scanned in and paced in Provider box.

## 2023-01-24 ENCOUNTER — TELEPHONE (OUTPATIENT)
Dept: INTERNAL MEDICINE CLINIC | Age: 78
End: 2023-01-24

## 2023-01-24 NOTE — TELEPHONE ENCOUNTER
----- Message from [address] sent at 1/24/2023  2:29 PM EST -----  Subject: Message to Provider    QUESTIONS  Information for Provider? The patient's son Ivonne Mckinley (on HIPAA) needs POA   forms signed by her PCP. Please call Ivonne Mckinley to let him know when to drop   off the forms to the office or is an appt needed ?   ---------------------------------------------------------------------------  --------------  [address] [employer]  [phone]; OK to leave message on voicemail  ---------------------------------------------------------------------------  --------------  SCRIPT ANSWERS  Relationship to Patient? Other  Representative Name? rhea Mckinley - checked in EPIC & good until 5/2023  Is the Representative on the appropriate HIPAA document in Epic?  Yes

## 2023-01-24 NOTE — TELEPHONE ENCOUNTER
LVM for son Douglas Ortiz to call office. Is this a new form or same one PCP from December 2022? If new he can drop off anytime.

## 2023-01-25 ENCOUNTER — TELEPHONE (OUTPATIENT)
Dept: INTERNAL MEDICINE CLINIC | Age: 78
End: 2023-01-25

## 2023-01-25 NOTE — TELEPHONE ENCOUNTER
----- Message from Tamia Sim sent at 1/24/2023  4:52 PM EST -----  Subject: Message to Provider    QUESTIONS  Information for Provider? pt almaz Danielle would like for Tiffany Merritt to give him a   call back regarding the pt.  ---------------------------------------------------------------------------  --------------  [address] [facility]  [phone]; OK to leave message on voicemail  ---------------------------------------------------------------------------  --------------  SCRIPT ANSWERS  undefined

## 2023-01-27 NOTE — TELEPHONE ENCOUNTER
LVM for son Ivonne Mckinley that paperwork he dropped off is ready for pickup and will be at the .      POA Forms scanned to record

## 2023-07-31 ENCOUNTER — TELEPHONE (OUTPATIENT)
Age: 78
End: 2023-07-31

## 2023-07-31 NOTE — TELEPHONE ENCOUNTER
Received call from patient      Caller States:  Symptoms/concern:   Sore throat  Very dry throat  Sweats/fever  Cough   Onset:   About a month   What has been tried:   Throat lozenges    Some otc medicines? (Hard to understand pt accent)    Appointments:  Appointment offers during call:  No pcp appts available at this time for this psr  Date of last appointment:    4/13/2023     Requesting:  Appt or medication  Call to advise              Pharmacy confirmed as:  [facility] [address] [phone] - F [phone]            Caller confirms readback of documented phone/fax number(s) as correct. Caller advised that there can be a 24-48 business hour turn around time on callbacks. Caller advised that if pt deems they cannot wait any longer or symptoms worsen, ED or UC would be another option to consider for immediate treatment.

## 2023-08-01 ENCOUNTER — TELEMEDICINE (OUTPATIENT)
Age: 78
End: 2023-08-01
Payer: MEDICARE

## 2023-08-01 ENCOUNTER — TELEPHONE (OUTPATIENT)
Age: 78
End: 2023-08-01

## 2023-08-01 DIAGNOSIS — J02.9 PHARYNGITIS, UNSPECIFIED ETIOLOGY: Primary | ICD-10-CM

## 2023-08-01 PROCEDURE — 99442 PR PHYS/QHP TELEPHONE EVALUATION 11-20 MIN: CPT | Performed by: INTERNAL MEDICINE

## 2023-08-01 RX ORDER — AMOXICILLIN 500 MG/1
500 CAPSULE ORAL 3 TIMES DAILY
Qty: 21 CAPSULE | Refills: 0 | Status: SHIPPED | OUTPATIENT
Start: 2023-08-01 | End: 2023-08-08

## 2023-08-01 NOTE — PROGRESS NOTES
1. \"Have you been to the ER, urgent care clinic since your last visit? Hospitalized since your last visit? \" No
Review of Systems     Physical Exam     ASSESSMENT and PLAN  Jn Scott was seen today for pharyngitis. Diagnoses and all orders for this visit:    Pharyngitis, unspecified etiology  -     amoxicillin (AMOXIL) 500 MG capsule;  Take 1 capsule by mouth 3 times daily for 7 days    Dry mouth  Advised lozenges  Advised not antihistamine and make sure not taking tizanidine        Appt or UC if not improved  George Sutton MD

## 2023-08-03 ENCOUNTER — TELEPHONE (OUTPATIENT)
Age: 78
End: 2023-08-03

## 2023-08-04 NOTE — TELEPHONE ENCOUNTER
Pt feels the medication is not working for what is wrong. (She is not explaining this right to Dr. Juan Miguel Ames she states)      Pt is negative for COVID. Pt is asking for the nurse to call to help her with this.   Pt states she has been waiting for a call back since 8-3-23 @ 11 am

## 2023-08-04 NOTE — TELEPHONE ENCOUNTER
Spoke with patient and advised to go to . Patient refused and states she only wants to see Dr. Jessy Wellington and wants to be seen soon. I advised patient he is out of the office for the weekend and will send a message over to [facility] for availability. Patient expressed she wants to be called Monday and seen in office next week. I advised patient her message will be delivered but cannot promise will be seen as soon as Monday in office and will let PSR know to call at earliest convenience. Please advise.

## 2023-08-04 NOTE — TELEPHONE ENCOUNTER
----- Message from [facility] sent at 8/3/2023  9:14 AM EDT -----  Subject: Medication Problem    Medication: amoxicillin (AMOXIL) 500 MG capsule  Dosage: 500 mg Take 1 capsule by mouth 3 times daily for 7 days  Ordering Provider: Ofelia Guerin     Question/Problem: Pt states that medication is not helping her she would   like to be seen asap in person wants PCP to check her would need different   meds please call PT back to go over information       Pharmacy: [facility], [address]   [phone] Diana Lundberg [phone]    ---------------------------------------------------------------------------  --------------  [place]  [phone]; OK to leave message on voicemail  ---------------------------------------------------------------------------  --------------    SCRIPT ANSWERS  Relationship to Patient: Self

## 2023-08-07 NOTE — TELEPHONE ENCOUNTER
Called and informed patient of the Rx being sent to the pharmacy -  also scheduled the patient for Friday 8/18 at 1:30pm

## 2023-08-16 ENCOUNTER — TELEPHONE (OUTPATIENT)
Age: 78
End: 2023-08-16

## 2023-10-11 ENCOUNTER — TELEPHONE (OUTPATIENT)
Age: 78
End: 2023-10-11

## 2023-10-11 NOTE — TELEPHONE ENCOUNTER
----- Message from Esperanza Ragsdale sent at 10/11/2023 10:25 AM EDT -----  Subject: Appointment Request    Reason for Call: Established Patient Appointment needed: Routine Medicare   AWV    QUESTIONS    Reason for appointment request? No appointments available during search     Additional Information for Provider? Patient is wanting to schedule an   annual wellness visit, please call patient back to discuss. ---------------------------------------------------------------------------  --------------  Elvira Winston [facility]  [phone];  Do not leave any message, patient will call back for answer  ---------------------------------------------------------------------------  --------------  SCRIPT ANSWERS

## 2023-11-14 ENCOUNTER — OFFICE VISIT (OUTPATIENT)
Age: 78
End: 2023-11-14
Payer: MEDICARE

## 2023-11-14 VITALS
BODY MASS INDEX: 18.42 KG/M2 | DIASTOLIC BLOOD PRESSURE: 60 MMHG | TEMPERATURE: 97.4 F | HEART RATE: 88 BPM | WEIGHT: 93.8 LBS | HEIGHT: 60 IN | SYSTOLIC BLOOD PRESSURE: 134 MMHG | OXYGEN SATURATION: 96 % | RESPIRATION RATE: 16 BRPM

## 2023-11-14 DIAGNOSIS — R13.12 OROPHARYNGEAL DYSPHAGIA: ICD-10-CM

## 2023-11-14 DIAGNOSIS — M81.0 OSTEOPOROSIS, UNSPECIFIED OSTEOPOROSIS TYPE, UNSPECIFIED PATHOLOGICAL FRACTURE PRESENCE: ICD-10-CM

## 2023-11-14 DIAGNOSIS — Z23 ENCOUNTER FOR IMMUNIZATION: ICD-10-CM

## 2023-11-14 DIAGNOSIS — E78.5 HYPERLIPIDEMIA, UNSPECIFIED HYPERLIPIDEMIA TYPE: Primary | ICD-10-CM

## 2023-11-14 DIAGNOSIS — R68.2 DRY MOUTH: ICD-10-CM

## 2023-11-14 DIAGNOSIS — Z00.00 MEDICARE ANNUAL WELLNESS VISIT, SUBSEQUENT: ICD-10-CM

## 2023-11-14 PROCEDURE — 99214 OFFICE O/P EST MOD 30 MIN: CPT | Performed by: INTERNAL MEDICINE

## 2023-11-14 PROCEDURE — G0439 PPPS, SUBSEQ VISIT: HCPCS | Performed by: INTERNAL MEDICINE

## 2023-11-14 PROCEDURE — G8484 FLU IMMUNIZE NO ADMIN: HCPCS | Performed by: INTERNAL MEDICINE

## 2023-11-14 PROCEDURE — 1090F PRES/ABSN URINE INCON ASSESS: CPT | Performed by: INTERNAL MEDICINE

## 2023-11-14 PROCEDURE — 1123F ACP DISCUSS/DSCN MKR DOCD: CPT | Performed by: INTERNAL MEDICINE

## 2023-11-14 PROCEDURE — G8419 CALC BMI OUT NRM PARAM NOF/U: HCPCS | Performed by: INTERNAL MEDICINE

## 2023-11-14 PROCEDURE — G8427 DOCREV CUR MEDS BY ELIG CLIN: HCPCS | Performed by: INTERNAL MEDICINE

## 2023-11-14 PROCEDURE — G8399 PT W/DXA RESULTS DOCUMENT: HCPCS | Performed by: INTERNAL MEDICINE

## 2023-11-14 PROCEDURE — 4004F PT TOBACCO SCREEN RCVD TLK: CPT | Performed by: INTERNAL MEDICINE

## 2023-11-14 RX ORDER — TRIAMCINOLONE ACETONIDE 55 UG/1
SPRAY, METERED NASAL
COMMUNITY

## 2023-11-14 RX ORDER — CELECOXIB 200 MG/1
200 CAPSULE ORAL DAILY PRN
COMMUNITY
Start: 2023-03-11

## 2023-11-14 RX ORDER — DENOSUMAB 60 MG/ML
60 INJECTION SUBCUTANEOUS
COMMUNITY
Start: 2016-12-06

## 2023-11-14 SDOH — ECONOMIC STABILITY: FOOD INSECURITY: WITHIN THE PAST 12 MONTHS, THE FOOD YOU BOUGHT JUST DIDN'T LAST AND YOU DIDN'T HAVE MONEY TO GET MORE.: NEVER TRUE

## 2023-11-14 SDOH — ECONOMIC STABILITY: FOOD INSECURITY: WITHIN THE PAST 12 MONTHS, YOU WORRIED THAT YOUR FOOD WOULD RUN OUT BEFORE YOU GOT MONEY TO BUY MORE.: NEVER TRUE

## 2023-11-14 SDOH — ECONOMIC STABILITY: HOUSING INSECURITY
IN THE LAST 12 MONTHS, WAS THERE A TIME WHEN YOU DID NOT HAVE A STEADY PLACE TO SLEEP OR SLEPT IN A SHELTER (INCLUDING NOW)?: NO

## 2023-11-14 SDOH — ECONOMIC STABILITY: INCOME INSECURITY: HOW HARD IS IT FOR YOU TO PAY FOR THE VERY BASICS LIKE FOOD, HOUSING, MEDICAL CARE, AND HEATING?: NOT HARD AT ALL

## 2023-11-14 ASSESSMENT — PATIENT HEALTH QUESTIONNAIRE - PHQ9
SUM OF ALL RESPONSES TO PHQ QUESTIONS 1-9: 2
SUM OF ALL RESPONSES TO PHQ9 QUESTIONS 1 & 2: 2
1. LITTLE INTEREST OR PLEASURE IN DOING THINGS: 1
2. FEELING DOWN, DEPRESSED OR HOPELESS: 1
SUM OF ALL RESPONSES TO PHQ QUESTIONS 1-9: 2

## 2023-11-14 ASSESSMENT — LIFESTYLE VARIABLES
HOW OFTEN DO YOU HAVE A DRINK CONTAINING ALCOHOL: NEVER
HOW MANY STANDARD DRINKS CONTAINING ALCOHOL DO YOU HAVE ON A TYPICAL DAY: PATIENT DOES NOT DRINK

## 2023-11-14 NOTE — PATIENT INSTRUCTIONS
If you have questions about a medical condition or this instruction, always ask your healthcare professional. [address] any warranty or liability for your use of this information. Personalized Preventive Plan for Chaitanya Navas - 11/14/2023  Medicare offers a range of preventive health benefits. Some of the tests and screenings are paid in full while other may be subject to a deductible, co-insurance, and/or copay. Some of these benefits include a comprehensive review of your medical history including lifestyle, illnesses that may run in your family, and various assessments and screenings as appropriate. After reviewing your medical record and screening and assessments performed today your provider may have ordered immunizations, labs, imaging, and/or referrals for you. A list of these orders (if applicable) as well as your Preventive Care list are included within your After Visit Summary for your review. Other Preventive Recommendations:    A preventive eye exam performed by an eye specialist is recommended every 1-2 years to screen for glaucoma; cataracts, macular degeneration, and other eye disorders. A preventive dental visit is recommended every 6 months. Try to get at least 150 minutes of exercise per week or 10,000 steps per day on a pedometer . Order or download the FREE \"Exercise & Physical Activity: Your Everyday Guide\" from [employer]. Call 3-601.296.4199 or search [employer] online. You need 6368-2529 mg of calcium and 8092-5488 IU of vitamin D per day. It is possible to meet your calcium requirement with diet alone, but a vitamin D supplement is usually necessary to meet this goal.  When exposed to the sun, use a sunscreen that protects against both UVA and UVB radiation with an SPF of 30 or greater. Reapply every 2 to 3 hours or after sweating, drying off with a towel, or swimming.   Always wear a seat belt when traveling

## 2023-11-14 NOTE — PROGRESS NOTES
1. \"Have you been to the ER, urgent care clinic since your last visit? Hospitalized since your last visit? \" No    2. \"Have you seen or consulted any other health care providers outside of the [address] [place] [address] since your last visit? \"       [facility]    3. For patients aged 43-73: Has the patient had a colonoscopy / FIT/ Cologuard? No      If the patient is female:    4. For patients aged 43-66: Has the patient had a mammogram within the past 2 years? No      5. For patients aged 21-65: Has the patient had a pap smear?   No
01:20 PM    K 4.7 12/04/2019 01:20 PM     12/04/2019 01:20 PM    CO2 26 12/04/2019 01:20 PM    BUN 17 12/04/2019 01:20 PM    CREATININE 0.65 12/04/2019 01:20 PM    GLUCOSE 80 12/04/2019 01:20 PM    CALCIUM 9.1 12/04/2019 01:20 PM      CBC:   Lab Results   Component Value Date/Time    WBC 4.7 12/04/2019 01:20 PM    RBC 4.05 12/04/2019 01:20 PM    HGB 12.9 12/04/2019 01:20 PM    HCT 37.8 12/04/2019 01:20 PM    MCV 93 12/04/2019 01:20 PM    MCH 31.9 12/04/2019 01:20 PM    MCHC 34.1 12/04/2019 01:20 PM    RDW 12.3 12/04/2019 01:20 PM     12/04/2019 01:20 PM      Lipids   Lab Results   Component Value Date/Time    CHOL 277 05/13/2022 11:25 AM    TRIG 89 05/13/2022 11:25 AM     05/13/2022 11:25 AM    LDLCALC 155.2 05/13/2022 11:25 AM    LABVLDL 17.8 05/13/2022 11:25 AM    CHOLHDLRATIO 2.7 05/13/2022 11:25 AM     Hemoglobin A1C: No results found for: \"LABA1C\", \"ICE9FDFR\"     Review of Systems     Physical Exam  Constitutional:       Appearance: Normal appearance. Comments: Thin elderly, nad   HENT:      Head: Normocephalic and atraumatic. Right Ear: Tympanic membrane normal.      Left Ear: Tympanic membrane normal.      Nose: Nose normal.      Mouth/Throat:      Mouth: Mucous membranes are moist.      Comments: Dry tongue but posterior pharynx appears moist  Eyes:      Pupils: Pupils are equal, round, and reactive to light. Cardiovascular:      Rate and Rhythm: Normal rate and regular rhythm. Pulses: Normal pulses. Heart sounds: Normal heart sounds. Pulmonary:      Effort: Pulmonary effort is normal.   Abdominal:      General: Abdomen is flat. Musculoskeletal:         General: Normal range of motion. Cervical back: Normal range of motion and neck supple. Skin:     General: Skin is warm. Neurological:      General: No focal deficit present. Mental Status: She is alert. Psychiatric:         Mood and Affect: Mood normal.         Thought Content:  Thought content

## 2023-11-15 LAB
ANION GAP SERPL CALC-SCNC: 5 MMOL/L (ref 5–15)
BUN SERPL-MCNC: 15 MG/DL (ref 6–20)
BUN/CREAT SERPL: 12 (ref 12–20)
CALCIUM SERPL-MCNC: 8.8 MG/DL (ref 8.5–10.1)
CHLORIDE SERPL-SCNC: 101 MMOL/L (ref 97–108)
CHOLEST SERPL-MCNC: 247 MG/DL
CO2 SERPL-SCNC: 30 MMOL/L (ref 21–32)
CREAT SERPL-MCNC: 1.21 MG/DL (ref 0.55–1.02)
ERYTHROCYTE [DISTWIDTH] IN BLOOD BY AUTOMATED COUNT: 12.6 % (ref 11.5–14.5)
GLUCOSE SERPL-MCNC: 100 MG/DL (ref 65–100)
HCT VFR BLD AUTO: 41.3 % (ref 35–47)
HDLC SERPL-MCNC: 107 MG/DL
HDLC SERPL: 2.3 (ref 0–5)
HGB BLD-MCNC: 13.4 G/DL (ref 11.5–16)
LDLC SERPL CALC-MCNC: 126.8 MG/DL (ref 0–100)
MCH RBC QN AUTO: 31.5 PG (ref 26–34)
MCHC RBC AUTO-ENTMCNC: 32.4 G/DL (ref 30–36.5)
MCV RBC AUTO: 97.2 FL (ref 80–99)
NRBC # BLD: 0 K/UL (ref 0–0.01)
NRBC BLD-RTO: 0 PER 100 WBC
PLATELET # BLD AUTO: 232 K/UL (ref 150–400)
PMV BLD AUTO: 9.2 FL (ref 8.9–12.9)
POTASSIUM SERPL-SCNC: 4 MMOL/L (ref 3.5–5.1)
RBC # BLD AUTO: 4.25 M/UL (ref 3.8–5.2)
SODIUM SERPL-SCNC: 136 MMOL/L (ref 136–145)
TRIGL SERPL-MCNC: 66 MG/DL
TSH SERPL DL<=0.05 MIU/L-ACNC: 1.19 UIU/ML (ref 0.36–3.74)
VLDLC SERPL CALC-MCNC: 13.2 MG/DL
WBC # BLD AUTO: 6.4 K/UL (ref 3.6–11)

## 2023-11-16 LAB
ENA SS-A AB SER-ACNC: <0.2 AI (ref 0–0.9)
ENA SS-B AB SER-ACNC: <0.2 AI (ref 0–0.9)

## 2024-03-25 ENCOUNTER — ANESTHESIA EVENT (OUTPATIENT)
Facility: HOSPITAL | Age: 79
End: 2024-03-25
Payer: MEDICARE

## 2024-03-25 ENCOUNTER — HOSPITAL ENCOUNTER (OUTPATIENT)
Facility: HOSPITAL | Age: 79
Setting detail: OUTPATIENT SURGERY
Discharge: HOME | End: 2024-03-25
Attending: STUDENT IN AN ORGANIZED HEALTH CARE EDUCATION/TRAINING PROGRAM | Admitting: STUDENT IN AN ORGANIZED HEALTH CARE EDUCATION/TRAINING PROGRAM
Payer: MEDICARE

## 2024-03-25 ENCOUNTER — ANESTHESIA (OUTPATIENT)
Facility: HOSPITAL | Age: 79
End: 2024-03-25
Payer: MEDICARE

## 2024-03-25 VITALS
OXYGEN SATURATION: 98 % | DIASTOLIC BLOOD PRESSURE: 71 MMHG | WEIGHT: 87.5 LBS | SYSTOLIC BLOOD PRESSURE: 160 MMHG | TEMPERATURE: 98 F | HEIGHT: 60 IN | RESPIRATION RATE: 15 BRPM | HEART RATE: 71 BPM | BODY MASS INDEX: 17.18 KG/M2

## 2024-03-25 PROCEDURE — 3700000001 HC ADD 15 MINUTES (ANESTHESIA): Performed by: STUDENT IN AN ORGANIZED HEALTH CARE EDUCATION/TRAINING PROGRAM

## 2024-03-25 PROCEDURE — 88305 TISSUE EXAM BY PATHOLOGIST: CPT

## 2024-03-25 PROCEDURE — 2709999900 HC NON-CHARGEABLE SUPPLY: Performed by: STUDENT IN AN ORGANIZED HEALTH CARE EDUCATION/TRAINING PROGRAM

## 2024-03-25 PROCEDURE — 7100000010 HC PHASE II RECOVERY - FIRST 15 MIN: Performed by: STUDENT IN AN ORGANIZED HEALTH CARE EDUCATION/TRAINING PROGRAM

## 2024-03-25 PROCEDURE — 2500000003 HC RX 250 WO HCPCS: Performed by: NURSE ANESTHETIST, CERTIFIED REGISTERED

## 2024-03-25 PROCEDURE — 3600007502: Performed by: STUDENT IN AN ORGANIZED HEALTH CARE EDUCATION/TRAINING PROGRAM

## 2024-03-25 PROCEDURE — 2580000003 HC RX 258: Performed by: STUDENT IN AN ORGANIZED HEALTH CARE EDUCATION/TRAINING PROGRAM

## 2024-03-25 PROCEDURE — 3600007512: Performed by: STUDENT IN AN ORGANIZED HEALTH CARE EDUCATION/TRAINING PROGRAM

## 2024-03-25 PROCEDURE — 3700000000 HC ANESTHESIA ATTENDED CARE: Performed by: STUDENT IN AN ORGANIZED HEALTH CARE EDUCATION/TRAINING PROGRAM

## 2024-03-25 PROCEDURE — 6360000002 HC RX W HCPCS: Performed by: NURSE ANESTHETIST, CERTIFIED REGISTERED

## 2024-03-25 PROCEDURE — 7100000011 HC PHASE II RECOVERY - ADDTL 15 MIN: Performed by: STUDENT IN AN ORGANIZED HEALTH CARE EDUCATION/TRAINING PROGRAM

## 2024-03-25 RX ORDER — SODIUM CHLORIDE 9 MG/ML
25 INJECTION, SOLUTION INTRAVENOUS PRN
Status: DISCONTINUED | OUTPATIENT
Start: 2024-03-25 | End: 2024-03-25 | Stop reason: HOSPADM

## 2024-03-25 RX ORDER — SODIUM CHLORIDE 0.9 % (FLUSH) 0.9 %
5-40 SYRINGE (ML) INJECTION EVERY 12 HOURS SCHEDULED
Status: DISCONTINUED | OUTPATIENT
Start: 2024-03-25 | End: 2024-03-25 | Stop reason: HOSPADM

## 2024-03-25 RX ORDER — LIDOCAINE HYDROCHLORIDE 20 MG/ML
INJECTION, SOLUTION EPIDURAL; INFILTRATION; INTRACAUDAL; PERINEURAL PRN
Status: DISCONTINUED | OUTPATIENT
Start: 2024-03-25 | End: 2024-03-25 | Stop reason: SDUPTHER

## 2024-03-25 RX ORDER — SODIUM CHLORIDE 0.9 % (FLUSH) 0.9 %
5-40 SYRINGE (ML) INJECTION PRN
Status: DISCONTINUED | OUTPATIENT
Start: 2024-03-25 | End: 2024-03-25 | Stop reason: HOSPADM

## 2024-03-25 RX ADMIN — PROPOFOL 20 MG: 10 INJECTION, EMULSION INTRAVENOUS at 09:39

## 2024-03-25 RX ADMIN — SODIUM CHLORIDE 25 ML: 9 INJECTION, SOLUTION INTRAVENOUS at 08:35

## 2024-03-25 RX ADMIN — PROPOFOL 10 MG: 10 INJECTION, EMULSION INTRAVENOUS at 09:45

## 2024-03-25 RX ADMIN — LIDOCAINE HYDROCHLORIDE 30 MG: 20 INJECTION, SOLUTION EPIDURAL; INFILTRATION; INTRACAUDAL; PERINEURAL at 09:37

## 2024-03-25 RX ADMIN — PROPOFOL 50 MG: 10 INJECTION, EMULSION INTRAVENOUS at 09:37

## 2024-03-25 RX ADMIN — PROPOFOL 20 MG: 10 INJECTION, EMULSION INTRAVENOUS at 09:42

## 2024-03-25 ASSESSMENT — PAIN - FUNCTIONAL ASSESSMENT: PAIN_FUNCTIONAL_ASSESSMENT: 0-10

## 2024-03-25 NOTE — PROGRESS NOTES
Endoscopy Case End Note:    0947:  Procedure scope was pre-cleaned, per protocol, at bedside by SHAINA Salinas.      0947:  Report received from anesthesia - MAYUR Freeman.  See anesthesia flowsheet for intra-procedure vital signs and events.    0947:  glasses returned to patient.

## 2024-03-25 NOTE — ANESTHESIA POSTPROCEDURE EVALUATION
Department of Anesthesiology  Postprocedure Note    Patient: Solis Zepeda  MRN: [id number]  YOB: 1945  Date of evaluation: 3/25/2024    Procedure Summary       Date: 03/25/24 Room / [facility] / [facility]    Anesthesia Start: 0934 Anesthesia Stop: 0952    Procedure: EGD WITH BIOPSY (Upper GI Region) Diagnosis:       Odynophagia      (Odynophagia [R13.10])    Surgeons: Zoe Walters MD Responsible Provider: Alfredo Freitas MD    Anesthesia Type: MAC ASA Status: 2            Anesthesia Type: No value filed.    Ronnell Phase I: Ronnell Score: 10    Ronnell Phase II: Ronnell Score: 10    Anesthesia Post Evaluation    Patient location during evaluation: PACU  Patient participation: complete - patient participated  Level of consciousness: sleepy but conscious and responsive to verbal stimuli  Pain score: 0  Airway patency: patent  Nausea & Vomiting: no vomiting and no nausea  Cardiovascular status: blood pressure returned to baseline and hemodynamically stable  Respiratory status: acceptable  Hydration status: stable    No notable events documented.

## 2024-03-25 NOTE — OP NOTE
[facility]                   Endoscopic Gastroduodenoscopy Procedure Note      3/25/2024  Solis Zepeda  1945  [id number]    Procedure: Endoscopic Gastroduodenoscopy with biopsy    Indication: Dyphagia/odynophagia     Pre-operative Diagnosis: see indication above    Post-operative Diagnosis: see findings below    : Zoe Walters MD    Referring Provider:  Gilberto Elliott MD      Anesthesia/Sedation:  MAC anesthesia Propofol    Airway assessment: No airway problems anticipated    Pre-Procedural Exam:      Airway: clear, no airway problems anticipated  Heart: RRR, without gallops or rubs  Lungs: clear bilaterally without wheezes, crackles, or rhonchi  Abdomen: soft, nontender, nondistended, bowel sounds present  Mental Status: awake, alert and oriented to person, place and time       Procedure Details     After infomed consent was obtained for the procedure, with all risks and benefits of procedure explained the patient was taken to the endoscopy suite and placed in the left lateral decubitus position.  Following sequential administration of sedation as per above, the endoscope was inserted into the mouth and advanced under direct vision to second portion of the duodenum.  A careful inspection was made as the gastroscope was withdrawn, including a retroflexed view of the proximal stomach; findings and interventions are described below.      Findings:   Esophagus: the overall caliber of the esophagus appeared mildly diffusely narrowed but there were no focal rings or strictures and there was no mucosal inflammation.  There were multiple lesions that appeared to be squamous papillomas throughout the esophagus.  Biopsies were obtained from the distal and proximal esophagus to assess for EoE and lymphocytic esophagitis.  The Z line measured 43 cm from the incisors and was mildly irregular but not extending above the GE junction  Stomach: normal gastric mucosa, biopsied for H

## 2024-03-25 NOTE — H&P
Medication Sig Start Date End Date Taking? Authorizing Provider   celecoxib (CELEBREX) 200 MG capsule Take 1 capsule by mouth daily as needed 3/11/23   Francis Bradley MD   triamcinolone (NASACORT) 55 MCG/ACT nasal inhaler by Nasal route    Francis Bradley MD   vitamin D (CHOLECALCIFEROL) 35368 UNIT CAPS Take 1 capsule by mouth daily    Francis Bradley MD   denosumab (PROLIA) 60 MG/ML SOSY SC injection Inject 1 mL into the skin 12/6/16   Francis Bradley MD   carboxymethylcellulose 1 % ophthalmic solution Place 1 drop into both eyes 3 times daily    Francis Bradley MD   fluticasone (ARNUITY ELLIPTA) 100 MCG/ACT AEPB Inhale 1 puff into the lungs daily    Automatic Reconciliation, Ar   Magnesium 500 MG CAPS Take by mouth    Automatic Reconciliation, Ar   montelukast (SINGULAIR) 10 MG tablet TK 1 T PO IN THE LAURA 10/27/16   Automatic Reconciliation, Ar       Physical Exam:   HEENT: Head: Normocephalic, no lesions, without obvious abnormality.  Mallampati II   Heart: regular rate and rhythm, S1, S2 normal   Lungs: chest clear, no wheezing, rales, normal symmetric air entry   Abdominal:  abdomen is soft, nontender, nondistended     I discussed the details of the procedure for EGD with the patient and the associated risks including sedation/airway complication, bleeding, and perforation.  The patient provided consent and agreed with proceeding.    Signed:  Zoe Walters MD 3/25/2024

## 2024-03-25 NOTE — ANESTHESIA PRE PROCEDURE
montelukast (SINGULAIR) 10 MG tablet TK 1 T PO IN THE LAURA         Allergies:  No Known Allergies    Problem List:    Patient Active Problem List   Diagnosis Code   • Interstitial cystitis N30.10   • Osteoporosis M81.0   • Asthma J45.909   • Gallstones K80.20   • Fibromyalgia M79.7   • Epigastric pain R10.13   • Occult blood positive stool R19.5   • Constipation K59.00   • Primary insomnia F51.01   • Vitamin D deficiency E55.9   • HLD (hyperlipidemia) E78.5   • Blood in stool K92.1       Past Medical History:        Diagnosis Date   • Asthma    • Blood in stool 11/7/2013   • Constipation 11/7/2013   • Epigastric pain 11/7/2013   • Hyperlipidemia    • Occult blood positive stool 11/7/2013       Past Surgical History:        Procedure Laterality Date   • COLONOSCOPY  3/19/08   • HEENT Right     right eye cataract and glaucoma surgery       Social History:    Social History     Tobacco Use   • Smoking status: Never   • Smokeless tobacco: Never   Substance Use Topics   • Alcohol use: No                                Counseling given: Not Answered      Vital Signs (Current): There were no vitals filed for this visit.                                           BP Readings from Last 3 Encounters:   11/14/23 134/60   04/13/23 116/76   05/13/22 110/60       NPO Status:                                                                                 BMI:   Wt Readings from Last 3 Encounters:   11/14/23 42.5 kg (93 lb 12.8 oz)   04/13/23 43 kg (94 lb 12.8 oz)   05/13/22 43.1 kg (95 lb)     There is no height or weight on file to calculate BMI.    CBC:   Lab Results   Component Value Date/Time    WBC 6.4 11/14/2023 12:35 PM    RBC 4.25 11/14/2023 12:35 PM    HGB 13.4 11/14/2023 12:35 PM    HCT 41.3 11/14/2023 12:35 PM    MCV 97.2 11/14/2023 12:35 PM    RDW 12.6 11/14/2023 12:35 PM     11/14/2023 12:35 PM       CMP:   Lab Results   Component Value Date/Time     11/14/2023 12:35 PM    K 4.0 11/14/2023 12:35 PM    CL

## 2024-04-04 ENCOUNTER — OFFICE VISIT (OUTPATIENT)
Age: 79
End: 2024-04-04
Payer: MEDICARE

## 2024-04-04 VITALS
RESPIRATION RATE: 12 BRPM | OXYGEN SATURATION: 94 % | HEIGHT: 60 IN | SYSTOLIC BLOOD PRESSURE: 110 MMHG | WEIGHT: 88 LBS | DIASTOLIC BLOOD PRESSURE: 60 MMHG | HEART RATE: 91 BPM | TEMPERATURE: 97.2 F | BODY MASS INDEX: 17.28 KG/M2

## 2024-04-04 DIAGNOSIS — R13.12 OROPHARYNGEAL DYSPHAGIA: Primary | ICD-10-CM

## 2024-04-04 DIAGNOSIS — R30.0 DYSURIA: ICD-10-CM

## 2024-04-04 DIAGNOSIS — E78.00 PURE HYPERCHOLESTEROLEMIA: ICD-10-CM

## 2024-04-04 DIAGNOSIS — H00.014 HORDEOLUM EXTERNUM OF LEFT UPPER EYELID: ICD-10-CM

## 2024-04-04 DIAGNOSIS — Z91.81 AT HIGH RISK FOR FALLS: ICD-10-CM

## 2024-04-04 DIAGNOSIS — R63.4 WEIGHT LOSS: ICD-10-CM

## 2024-04-04 PROCEDURE — 1123F ACP DISCUSS/DSCN MKR DOCD: CPT | Performed by: INTERNAL MEDICINE

## 2024-04-04 PROCEDURE — G8419 CALC BMI OUT NRM PARAM NOF/U: HCPCS | Performed by: INTERNAL MEDICINE

## 2024-04-04 PROCEDURE — 99214 OFFICE O/P EST MOD 30 MIN: CPT | Performed by: INTERNAL MEDICINE

## 2024-04-04 PROCEDURE — 1036F TOBACCO NON-USER: CPT | Performed by: INTERNAL MEDICINE

## 2024-04-04 PROCEDURE — G8399 PT W/DXA RESULTS DOCUMENT: HCPCS | Performed by: INTERNAL MEDICINE

## 2024-04-04 PROCEDURE — 1090F PRES/ABSN URINE INCON ASSESS: CPT | Performed by: INTERNAL MEDICINE

## 2024-04-04 PROCEDURE — G8427 DOCREV CUR MEDS BY ELIG CLIN: HCPCS | Performed by: INTERNAL MEDICINE

## 2024-04-04 RX ORDER — CEPHALEXIN 500 MG/1
500 CAPSULE ORAL 3 TIMES DAILY
Qty: 21 CAPSULE | Refills: 0 | Status: SHIPPED | OUTPATIENT
Start: 2024-04-04 | End: 2024-04-11

## 2024-04-04 RX ORDER — POLYMYXIN B SULFATE AND TRIMETHOPRIM 1; 10000 MG/ML; [USP'U]/ML
1 SOLUTION OPHTHALMIC EVERY 4 HOURS
Qty: 3 ML | Refills: 0 | Status: SHIPPED | OUTPATIENT
Start: 2024-04-04 | End: 2024-04-14

## 2024-04-04 RX ORDER — METHOCARBAMOL 500 MG/1
500 TABLET, FILM COATED ORAL 2 TIMES DAILY PRN
COMMUNITY

## 2024-04-04 RX ORDER — PRAVASTATIN SODIUM 10 MG
10 TABLET ORAL DAILY
Qty: 90 TABLET | Refills: 1 | Status: SHIPPED | OUTPATIENT
Start: 2024-04-04

## 2024-04-04 ASSESSMENT — PATIENT HEALTH QUESTIONNAIRE - PHQ9
SUM OF ALL RESPONSES TO PHQ QUESTIONS 1-9: 0
1. LITTLE INTEREST OR PLEASURE IN DOING THINGS: NOT AT ALL
SUM OF ALL RESPONSES TO PHQ QUESTIONS 1-9: 0
SUM OF ALL RESPONSES TO PHQ QUESTIONS 1-9: 0
2. FEELING DOWN, DEPRESSED OR HOPELESS: NOT AT ALL
SUM OF ALL RESPONSES TO PHQ9 QUESTIONS 1 & 2: 0
SUM OF ALL RESPONSES TO PHQ QUESTIONS 1-9: 0

## 2024-04-04 NOTE — PROGRESS NOTES
HISTORY OF PRESENT ILLNESS   Solis Zepeda   is a 78 y.o.  female.  FU chronic neck and back  Pain, mild HLD, osteoporosis on prolia, asthma and insomnia, weight loss   Referred to GI MD last OV for throat pain and edema of postcricoid area per ENT Dr Zepeda. C/o dififculty swallowing water  Had EGD Dr Walters-no strictures or esophagitis.--H pylori on path  Barium swallow ordered by GI MD  Swallowing water is still difficult uncomfortable sensation but no pain and no coughing/chocking and throat is dry  Lost a few lbs since last OV.  Has pain with urination x 6 months  Fell and has left lateral hip pain  Last OV     Rheum Dr Rock-on prolia for osteoporosis  URO Dr Heath -stress urinary incontinence     Saw Dr Zepeda ENT for throat pain and dry throat and itchy  Had laryngoscopy this month--moderate edema of postcricoid area otherwise normal     EGD recommended by ENT MD  Some voice changes diffficulty swallowing water-no cough or choking  Some itchy tongue sensation on lateral tongue     Weight has been stable-appetite ok  No f/c cp sob  Last -not on statin-  She declines cholestreol lowering medication  Asthma sxs on arnuity-none  Patient Active Problem List    Diagnosis Date Noted    Primary insomnia 06/03/2020    Gallstones 02/03/2019    HLD (hyperlipidemia) 03/10/2014    Epigastric pain 11/07/2013    Occult blood positive stool 11/07/2013    Constipation 11/07/2013    Blood in stool 11/07/2013    Interstitial cystitis 02/24/2011    Osteoporosis 02/12/2010    Asthma 02/12/2010    Fibromyalgia 02/12/2010    Vitamin D deficiency 02/12/2010     Current Outpatient Medications   Medication Sig Dispense Refill    celecoxib (CELEBREX) 200 MG capsule Take 1 capsule by mouth daily as needed      triamcinolone (NASACORT) 55 MCG/ACT nasal inhaler by Nasal route      vitamin D (CHOLECALCIFEROL) 40252 UNIT CAPS Take 1 capsule by mouth daily      denosumab (PROLIA) 60 MG/ML SOSY SC injection Inject 1 mL into the skin

## 2024-04-04 NOTE — PROGRESS NOTES
Chief Complaint   Patient presents with    Hyperlipidemia     3 month follow up        \"Have you been to the ER, urgent care clinic since your last visit?  Hospitalized since your last visit?\"    NO    “Have you seen or consulted any other health care providers outside of [facility] since your last visit?”    NO            Click Here for Release of Records Request

## 2024-04-05 LAB
APPEARANCE UR: ABNORMAL
BACTERIA URNS QL MICRO: ABNORMAL /HPF
BILIRUB UR QL CFM: NEGATIVE
COLOR UR: YELLOW
EPITH CASTS URNS QL MICRO: ABNORMAL /LPF
GLUCOSE UR STRIP.AUTO-MCNC: NEGATIVE MG/DL
HGB UR QL STRIP: NEGATIVE
HYALINE CASTS URNS QL MICRO: ABNORMAL /LPF (ref 0–5)
KETONES UR QL STRIP.AUTO: NEGATIVE MG/DL
LEUKOCYTE ESTERASE UR QL STRIP.AUTO: ABNORMAL
NITRITE UR QL STRIP.AUTO: POSITIVE
PH UR STRIP: 5 (ref 5–8)
PROT UR STRIP-MCNC: NEGATIVE MG/DL
RBC #/AREA URNS HPF: ABNORMAL /HPF (ref 0–5)
SP GR UR REFRACTOMETRY: 1.01 (ref 1–1.03)
URINE CULTURE IF INDICATED: ABNORMAL
UROBILINOGEN UR QL STRIP.AUTO: 0.2 EU/DL (ref 0.2–1)
WBC URNS QL MICRO: ABNORMAL /HPF (ref 0–4)

## 2024-04-05 RX ORDER — CIPROFLOXACIN 250 MG/1
250 TABLET, FILM COATED ORAL 2 TIMES DAILY
Qty: 6 TABLET | Refills: 0 | Status: SHIPPED | OUTPATIENT
Start: 2024-04-05 | End: 2024-04-08

## 2024-04-05 NOTE — RESULT ENCOUNTER NOTE
Attempted to reach patient. Left message on vm to return call    Per Dr. Elliott  Tell pt I sent in cipro x 3d for UTI. She needs to see URO Dr Heath ifor pain with urination if not improved

## 2024-07-29 ENCOUNTER — TELEPHONE (OUTPATIENT)
Age: 79
End: 2024-07-29

## 2024-09-23 ENCOUNTER — TELEPHONE (OUTPATIENT)
Age: 79
End: 2024-09-23

## 2024-09-23 NOTE — TELEPHONE ENCOUNTER
Patient called in notifying staff she would be dropping off forms for her husbands care (unable to obtain husbands info)

## 2024-10-10 ENCOUNTER — OFFICE VISIT (OUTPATIENT)
Age: 79
End: 2024-10-10
Payer: MEDICARE

## 2024-10-10 VITALS
HEIGHT: 60 IN | RESPIRATION RATE: 18 BRPM | WEIGHT: 84.6 LBS | OXYGEN SATURATION: 99 % | BODY MASS INDEX: 16.61 KG/M2 | TEMPERATURE: 97.3 F | HEART RATE: 73 BPM | DIASTOLIC BLOOD PRESSURE: 72 MMHG | SYSTOLIC BLOOD PRESSURE: 140 MMHG

## 2024-10-10 DIAGNOSIS — E78.00 PURE HYPERCHOLESTEROLEMIA: ICD-10-CM

## 2024-10-10 DIAGNOSIS — M81.0 OSTEOPOROSIS, UNSPECIFIED OSTEOPOROSIS TYPE, UNSPECIFIED PATHOLOGICAL FRACTURE PRESENCE: ICD-10-CM

## 2024-10-10 DIAGNOSIS — R63.4 WEIGHT LOSS: Primary | ICD-10-CM

## 2024-10-10 PROCEDURE — 1090F PRES/ABSN URINE INCON ASSESS: CPT | Performed by: INTERNAL MEDICINE

## 2024-10-10 PROCEDURE — G8419 CALC BMI OUT NRM PARAM NOF/U: HCPCS | Performed by: INTERNAL MEDICINE

## 2024-10-10 PROCEDURE — 99214 OFFICE O/P EST MOD 30 MIN: CPT | Performed by: INTERNAL MEDICINE

## 2024-10-10 PROCEDURE — G8484 FLU IMMUNIZE NO ADMIN: HCPCS | Performed by: INTERNAL MEDICINE

## 2024-10-10 PROCEDURE — 1123F ACP DISCUSS/DSCN MKR DOCD: CPT | Performed by: INTERNAL MEDICINE

## 2024-10-10 PROCEDURE — G8399 PT W/DXA RESULTS DOCUMENT: HCPCS | Performed by: INTERNAL MEDICINE

## 2024-10-10 PROCEDURE — 1036F TOBACCO NON-USER: CPT | Performed by: INTERNAL MEDICINE

## 2024-10-10 PROCEDURE — G8428 CUR MEDS NOT DOCUMENT: HCPCS | Performed by: INTERNAL MEDICINE

## 2024-10-10 RX ORDER — TIMOLOL MALEATE 2.5 MG/ML
SOLUTION/ DROPS OPHTHALMIC
COMMUNITY
Start: 2024-08-09

## 2024-10-10 RX ORDER — PILOCARPINE HYDROCHLORIDE 5 MG/1
5 TABLET, FILM COATED ORAL 2 TIMES DAILY
Qty: 60 TABLET | Refills: 5 | Status: SHIPPED | OUTPATIENT
Start: 2024-10-10

## 2024-10-10 NOTE — PROGRESS NOTES
\"Have you been to the ER, urgent care clinic since your last visit?  Hospitalized since your last visit?\"    NO    “Have you seen or consulted any other health care providers outside our system since your last visit?”    NO           
General: Normal range of motion.      Cervical back: Normal range of motion.      Right lower leg: No edema.   Neurological:      General: No focal deficit present.      Mental Status: She is alert.   Psychiatric:         Mood and Affect: Mood normal.         Behavior: Behavior normal.         Thought Content: Thought content normal.         Judgment: Judgment normal.        ASSESSMENT and PLAN  There are no diagnoses linked to this encounter.   Solis was seen today for medicare aw.    Diagnoses and all orders for this visit:    Weight loss  -     CBC; Future  -     Comprehensive Metabolic Panel; Future  -     TSH; Future   Encouraged to increase caloric intake  Pure hypercholesterolemia  -     CBC; Future  -     Comprehensive Metabolic Panel; Future  -     Lipid Panel; Future  -     TSH; Future   On pravastatin  Osteoporosis, unspecified osteoporosis type, unspecified pathological fracture presence  -     CBC; Future  -     Comprehensive Metabolic Panel; Future  -     TSH; Future   On prolia per RheuM mD    Dry mouth   Trial of salogen 5 mg bid   Discussed side effects-dairrhea , dizziness weakness etc  Gallstones   Denies RUQ pain. N/v  Other orders  -     pilocarpine (SALAGEN) 5 MG tablet; Take 1 tablet by mouth 2 times daily     RTC 6 months wellness, sooner prn  Gilberto Elliott MD

## 2024-12-05 RX ORDER — PRAVASTATIN SODIUM 10 MG
10 TABLET ORAL DAILY
Qty: 90 TABLET | Refills: 1 | Status: SHIPPED | OUTPATIENT
Start: 2024-12-05

## 2025-03-31 ENCOUNTER — TELEPHONE (OUTPATIENT)
Age: 80
End: 2025-03-31

## 2025-03-31 NOTE — TELEPHONE ENCOUNTER
Tomeka sommers would like an appt with pcp as soon as possible, [place] and would like pt to be seen before than so she's able to accompany pt to appt.    Pt is experiencing insomnia, states pt's spouse passed away about four weeks ago and pt hasn't been sleeping.    Please call to schedule as soon as possible.

## 2025-04-01 ENCOUNTER — TELEPHONE (OUTPATIENT)
Age: 80
End: 2025-04-01

## 2025-04-01 NOTE — TELEPHONE ENCOUNTER
----- Message from Ruchi ESCALERA sent at 3/31/2025  5:02 PM EDT -----  Regarding: ECC Escalation To Practice  ECC Escalation To Practice      Type of Escalation: Red Flag Symptom  --------------------------------------------------------------------------------------------------------------------------    Information for Provider: Gilberto Elliott MD      Patient is looking for appointment for: Symptom   Reasons for Message: Unable to reach practice     Additional Information / the patient experiencing insomnia, dizziness, tiredness, and weak. Wanted to book on before April 03, 2025  --------------------------------------------------------------------------------------------------------------------------    Relationship to Patient: Guardian / daughter Tomeka Zepeda  Call Back Info: OK to leave message on voicemail  Preferred Call Back Number: Phone  [phone]    The patient will be out of town on April 3,2025

## 2025-04-02 ENCOUNTER — OFFICE VISIT (OUTPATIENT)
Age: 80
End: 2025-04-02
Payer: MEDICARE

## 2025-04-02 ENCOUNTER — TELEPHONE (OUTPATIENT)
Age: 80
End: 2025-04-02

## 2025-04-02 VITALS
TEMPERATURE: 97.3 F | RESPIRATION RATE: 16 BRPM | HEART RATE: 95 BPM | WEIGHT: 85.4 LBS | DIASTOLIC BLOOD PRESSURE: 86 MMHG | BODY MASS INDEX: 16.77 KG/M2 | OXYGEN SATURATION: 98 % | HEIGHT: 60 IN | SYSTOLIC BLOOD PRESSURE: 137 MMHG

## 2025-04-02 DIAGNOSIS — G47.00 INSOMNIA, UNSPECIFIED TYPE: ICD-10-CM

## 2025-04-02 DIAGNOSIS — R01.1 HEART MURMUR: ICD-10-CM

## 2025-04-02 DIAGNOSIS — N39.0 RECURRENT UTI: ICD-10-CM

## 2025-04-02 DIAGNOSIS — G47.00 INSOMNIA, UNSPECIFIED TYPE: Primary | ICD-10-CM

## 2025-04-02 DIAGNOSIS — R41.3 MEMORY LOSS: ICD-10-CM

## 2025-04-02 LAB
BILIRUBIN, URINE, POC: NEGATIVE
BLOOD URINE, POC: ABNORMAL
GLUCOSE URINE, POC: NEGATIVE
KETONES, URINE, POC: NEGATIVE
LEUKOCYTE ESTERASE, URINE, POC: ABNORMAL
NITRITE, URINE, POC: NEGATIVE
PH, URINE, POC: 5.5 (ref 4.6–8)
PROTEIN,URINE, POC: NEGATIVE
SPECIFIC GRAVITY, URINE, POC: 1 (ref 1–1.03)
URINALYSIS CLARITY, POC: CLEAR
URINALYSIS COLOR, POC: YELLOW
UROBILINOGEN, POC: ABNORMAL

## 2025-04-02 PROCEDURE — 81003 URINALYSIS AUTO W/O SCOPE: CPT | Performed by: INTERNAL MEDICINE

## 2025-04-02 PROCEDURE — G8419 CALC BMI OUT NRM PARAM NOF/U: HCPCS | Performed by: INTERNAL MEDICINE

## 2025-04-02 PROCEDURE — PBSHW AMB POC URINALYSIS DIP STICK AUTO W/O MICRO: Performed by: INTERNAL MEDICINE

## 2025-04-02 PROCEDURE — 1159F MED LIST DOCD IN RCRD: CPT | Performed by: INTERNAL MEDICINE

## 2025-04-02 PROCEDURE — 1090F PRES/ABSN URINE INCON ASSESS: CPT | Performed by: INTERNAL MEDICINE

## 2025-04-02 PROCEDURE — 99214 OFFICE O/P EST MOD 30 MIN: CPT | Performed by: INTERNAL MEDICINE

## 2025-04-02 PROCEDURE — 1123F ACP DISCUSS/DSCN MKR DOCD: CPT | Performed by: INTERNAL MEDICINE

## 2025-04-02 PROCEDURE — G8399 PT W/DXA RESULTS DOCUMENT: HCPCS | Performed by: INTERNAL MEDICINE

## 2025-04-02 PROCEDURE — 1036F TOBACCO NON-USER: CPT | Performed by: INTERNAL MEDICINE

## 2025-04-02 PROCEDURE — G8427 DOCREV CUR MEDS BY ELIG CLIN: HCPCS | Performed by: INTERNAL MEDICINE

## 2025-04-02 RX ORDER — DOXEPIN 3 MG/1
3 TABLET, FILM COATED ORAL NIGHTLY
Qty: 30 TABLET | Refills: 1 | Status: SHIPPED | OUTPATIENT
Start: 2025-04-02 | End: 2025-04-02 | Stop reason: SDUPTHER

## 2025-04-02 RX ORDER — DOXEPIN 3 MG/1
3 TABLET, FILM COATED ORAL NIGHTLY
Qty: 30 TABLET | Refills: 1 | Status: SHIPPED | OUTPATIENT
Start: 2025-04-02

## 2025-04-02 RX ORDER — GABAPENTIN 300 MG/1
CAPSULE ORAL
COMMUNITY
Start: 2025-01-17

## 2025-04-02 RX ORDER — CYCLOSPORINE 0.5 MG/ML
EMULSION OPHTHALMIC
COMMUNITY
Start: 2025-01-09

## 2025-04-02 SDOH — ECONOMIC STABILITY: FOOD INSECURITY: WITHIN THE PAST 12 MONTHS, THE FOOD YOU BOUGHT JUST DIDN'T LAST AND YOU DIDN'T HAVE MONEY TO GET MORE.: NEVER TRUE

## 2025-04-02 SDOH — ECONOMIC STABILITY: FOOD INSECURITY: WITHIN THE PAST 12 MONTHS, YOU WORRIED THAT YOUR FOOD WOULD RUN OUT BEFORE YOU GOT MONEY TO BUY MORE.: NEVER TRUE

## 2025-04-02 ASSESSMENT — PATIENT HEALTH QUESTIONNAIRE - PHQ9
SUM OF ALL RESPONSES TO PHQ QUESTIONS 1-9: 0
SUM OF ALL RESPONSES TO PHQ QUESTIONS 1-9: 0
2. FEELING DOWN, DEPRESSED OR HOPELESS: NOT AT ALL
1. LITTLE INTEREST OR PLEASURE IN DOING THINGS: NOT AT ALL
SUM OF ALL RESPONSES TO PHQ QUESTIONS 1-9: 0
SUM OF ALL RESPONSES TO PHQ QUESTIONS 1-9: 0

## 2025-04-02 NOTE — PROGRESS NOTES
\"Have you been to the ER, urgent care clinic since your last visit?  Hospitalized since your last visit?\"    no    “Have you seen or consulted any other health care providers outside our system since your last visit?”    no             
mouth daily      denosumab (PROLIA) 60 MG/ML SOSY SC injection Inject 1 mL into the skin      carboxymethylcellulose 1 % ophthalmic solution Place 1 drop into both eyes 3 times daily      fluticasone (ARNUITY ELLIPTA) 100 MCG/ACT AEPB Inhale 1 puff into the lungs daily      Magnesium 500 MG CAPS Take by mouth      montelukast (SINGULAIR) 10 MG tablet TK 1 T PO IN THE LAURA       No current facility-administered medications for this visit.     No Known Allergies  Social History     Tobacco Use    Smoking status: Never    Smokeless tobacco: Never   Substance Use Topics    Alcohol use: No        BMP:   Lab Results   Component Value Date/Time     11/14/2023 12:35 PM    K 4.0 11/14/2023 12:35 PM     11/14/2023 12:35 PM    CO2 30 11/14/2023 12:35 PM    BUN 15 11/14/2023 12:35 PM    CREATININE 1.21 11/14/2023 12:35 PM    GLUCOSE 100 11/14/2023 12:35 PM    CALCIUM 8.8 11/14/2023 12:35 PM      CBC:   Lab Results   Component Value Date/Time    WBC 6.4 11/14/2023 12:35 PM    RBC 4.25 11/14/2023 12:35 PM    HGB 13.4 11/14/2023 12:35 PM    HCT 41.3 11/14/2023 12:35 PM    MCV 97.2 11/14/2023 12:35 PM    MCH 31.5 11/14/2023 12:35 PM    MCHC 32.4 11/14/2023 12:35 PM    RDW 12.6 11/14/2023 12:35 PM     11/14/2023 12:35 PM    MPV 9.2 11/14/2023 12:35 PM      Lipids   Lab Results   Component Value Date/Time    CHOL 247 11/14/2023 12:35 PM    TRIG 66 11/14/2023 12:35 PM     11/14/2023 12:35 PM    CHOLHDLRATIO 2.3 11/14/2023 12:35 PM     Hemoglobin A1C: No results found for: \"LABA1C\", \"[id number]\"     Review of Systems     Physical Exam  Constitutional:       Appearance: Normal appearance.      Comments: Thin underweight, NAD   HENT:      Head: Normocephalic.      Right Ear: Tympanic membrane normal.      Left Ear: Tympanic membrane normal.      Nose: Nose normal.   Cardiovascular:      Rate and Rhythm: Normal rate and regular rhythm.      Pulses: Normal pulses.      Heart sounds: Murmur heard.   Pulmonary:

## 2025-04-03 NOTE — TELEPHONE ENCOUNTER
Patient's daughter called regarding doxepin RX.  Requesting transfer to [address]. Sent in.

## 2025-04-04 ENCOUNTER — RESULTS FOLLOW-UP (OUTPATIENT)
Age: 80
End: 2025-04-04

## 2025-04-04 DIAGNOSIS — N30.90 CYSTITIS: Primary | ICD-10-CM

## 2025-04-04 LAB
BACTERIA SPEC CULT: ABNORMAL
CC UR VC: ABNORMAL
SERVICE CMNT-IMP: ABNORMAL

## 2025-04-04 RX ORDER — CIPROFLOXACIN 250 MG/1
250 TABLET, FILM COATED ORAL 2 TIMES DAILY
Qty: 6 TABLET | Refills: 0 | Status: SHIPPED | OUTPATIENT
Start: 2025-04-04 | End: 2025-04-07

## 2025-04-25 ENCOUNTER — OFFICE VISIT (OUTPATIENT)
Age: 80
End: 2025-04-25
Payer: MEDICARE

## 2025-04-25 VITALS
WEIGHT: 86.8 LBS | HEART RATE: 69 BPM | TEMPERATURE: 98.3 F | OXYGEN SATURATION: 98 % | BODY MASS INDEX: 17.04 KG/M2 | RESPIRATION RATE: 18 BRPM | HEIGHT: 60 IN

## 2025-04-25 DIAGNOSIS — R35.0 URINE FREQUENCY: ICD-10-CM

## 2025-04-25 DIAGNOSIS — R01.1 HEART MURMUR: ICD-10-CM

## 2025-04-25 DIAGNOSIS — G47.00 INSOMNIA, UNSPECIFIED TYPE: ICD-10-CM

## 2025-04-25 DIAGNOSIS — G47.00 INSOMNIA, UNSPECIFIED TYPE: Primary | ICD-10-CM

## 2025-04-25 DIAGNOSIS — E78.00 PURE HYPERCHOLESTEROLEMIA: ICD-10-CM

## 2025-04-25 DIAGNOSIS — R41.3 MEMORY LOSS: ICD-10-CM

## 2025-04-25 DIAGNOSIS — N39.0 RECURRENT UTI: ICD-10-CM

## 2025-04-25 DIAGNOSIS — Z00.00 MEDICARE ANNUAL WELLNESS VISIT, SUBSEQUENT: ICD-10-CM

## 2025-04-25 LAB
ANION GAP SERPL CALC-SCNC: 3 MMOL/L (ref 2–12)
APPEARANCE UR: CLEAR
BACTERIA URNS QL MICRO: ABNORMAL /HPF
BILIRUB UR QL: NEGATIVE
BUN SERPL-MCNC: 22 MG/DL (ref 6–20)
BUN/CREAT SERPL: 27 (ref 12–20)
CALCIUM SERPL-MCNC: 9.1 MG/DL (ref 8.5–10.1)
CHLORIDE SERPL-SCNC: 100 MMOL/L (ref 97–108)
CHOLEST SERPL-MCNC: 183 MG/DL
CO2 SERPL-SCNC: 32 MMOL/L (ref 21–32)
COLOR UR: ABNORMAL
CREAT SERPL-MCNC: 0.81 MG/DL (ref 0.55–1.02)
EPITH CASTS URNS QL MICRO: ABNORMAL /LPF
ERYTHROCYTE [DISTWIDTH] IN BLOOD BY AUTOMATED COUNT: 13.6 % (ref 11.5–14.5)
GLUCOSE SERPL-MCNC: 90 MG/DL (ref 65–100)
GLUCOSE UR STRIP.AUTO-MCNC: NEGATIVE MG/DL
HCT VFR BLD AUTO: 38.2 % (ref 35–47)
HDLC SERPL-MCNC: 94 MG/DL
HDLC SERPL: 1.9 (ref 0–5)
HGB BLD-MCNC: 12.5 G/DL (ref 11.5–16)
HGB UR QL STRIP: NEGATIVE
HYALINE CASTS URNS QL MICRO: ABNORMAL /LPF (ref 0–5)
KETONES UR QL STRIP.AUTO: NEGATIVE MG/DL
LDLC SERPL CALC-MCNC: 63.6 MG/DL (ref 0–100)
LEUKOCYTE ESTERASE UR QL STRIP.AUTO: ABNORMAL
MCH RBC QN AUTO: 30.9 PG (ref 26–34)
MCHC RBC AUTO-ENTMCNC: 32.7 G/DL (ref 30–36.5)
MCV RBC AUTO: 94.3 FL (ref 80–99)
NITRITE UR QL STRIP.AUTO: NEGATIVE
NRBC # BLD: 0 K/UL (ref 0–0.01)
NRBC BLD-RTO: 0 PER 100 WBC
PH UR STRIP: 6 (ref 5–8)
PLATELET # BLD AUTO: 208 K/UL (ref 150–400)
PMV BLD AUTO: 9.2 FL (ref 8.9–12.9)
POTASSIUM SERPL-SCNC: 4.3 MMOL/L (ref 3.5–5.1)
PROT UR STRIP-MCNC: NEGATIVE MG/DL
RBC # BLD AUTO: 4.05 M/UL (ref 3.8–5.2)
RBC #/AREA URNS HPF: ABNORMAL /HPF (ref 0–5)
SODIUM SERPL-SCNC: 135 MMOL/L (ref 136–145)
SP GR UR REFRACTOMETRY: 1.01 (ref 1–1.03)
TRIGL SERPL-MCNC: 127 MG/DL
TSH SERPL DL<=0.05 MIU/L-ACNC: 1.81 UIU/ML (ref 0.36–3.74)
URINE CULTURE IF INDICATED: ABNORMAL
UROBILINOGEN UR QL STRIP.AUTO: 0.2 EU/DL (ref 0.2–1)
VLDLC SERPL CALC-MCNC: 25.4 MG/DL
WBC # BLD AUTO: 5.2 K/UL (ref 3.6–11)
WBC URNS QL MICRO: ABNORMAL /HPF (ref 0–4)

## 2025-04-25 PROCEDURE — 99214 OFFICE O/P EST MOD 30 MIN: CPT | Performed by: INTERNAL MEDICINE

## 2025-04-25 RX ORDER — ZALEPLON 5 MG/1
5 CAPSULE ORAL NIGHTLY
Qty: 14 CAPSULE | Refills: 0 | Status: SHIPPED | OUTPATIENT
Start: 2025-04-25 | End: 2025-05-09

## 2025-04-25 NOTE — PATIENT INSTRUCTIONS
Narcotics Anonymous or [employer] [facility] or from treatment facilities.  If you relapse, get help as soon as you can. Some people make a plan with another person that outlines what they want that person to do for them if they relapse. The plan usually includes how to handle the relapse and who to notify in case of relapse.  Don't give up. Remember that a relapse doesn't mean that you have failed. Use the experience to learn the triggers that lead you to drink or use drugs. Then quit again. Recovery is a lifelong process. Many people have several relapses before they are able to quit for good.  Follow-up care is a key part of your treatment and safety. Be sure to make and go to all appointments, and call your doctor if you are having problems. It's also a good idea to know your test results and keep a list of the medicines you take.  When should you call for help?   Call 911  anytime you think you may need emergency care. For example, call if you or someone else:    Has overdosed or has withdrawal signs. Be sure to tell the emergency workers that you are or someone else is using or trying to quit using drugs. Overdose or withdrawal signs may include:  Losing consciousness.  Seizure.  Seeing or hearing things that aren't there (hallucinations).     Is thinking or talking about suicide or harming others.   Where to get help 24 hours a day, 7 days a week   If you or someone you know talks about suicide, self-harm, a mental health crisis, a substance use crisis, or any other kind of emotional distress, get help right away. You can:    Call the Suicide and Crisis Lifeline at 988.     Call 7-996-114-TALK (1-807.484.5306).     Text HOME to 797873 to access the Crisis Text Line.   Consider saving these numbers in your phone.  Go to [employer] for more information or to chat online.  Call your doctor now or seek immediate medical care if:    You are having withdrawal symptoms. These may include nausea or vomiting, sweating,

## 2025-04-25 NOTE — PROGRESS NOTES
Chief Complaint   Patient presents with    Medicare AWV    Blood Work    Allergic Reaction     Risperidone     \"Have you been to the ER, urgent care clinic since your last visit?  Hospitalized since your last visit?\"    YES - When: approximately 2  weeks ago.  Where and Why: [facility].    “Have you seen or consulted any other health care providers outside of [facility] since your last visit?”    NO                Financial Resource Strain: Low Risk  (11/14/2023)    Overall Financial Resource Strain (CARDIA)     Difficulty of Paying Living Expenses: Not hard at all      Food Insecurity: No Food Insecurity (4/2/2025)    Hunger Vital Sign     Worried About Running Out of Food in the Last Year: Never true     Ran Out of Food in the Last Year: Never true            4/25/2025    11:18 AM   PHQ-9    Little interest or pleasure in doing things 0   Feeling down, depressed, or hopeless 0   PHQ-2 Score 0   PHQ-9 Total Score 0       Health Maintenance Due   Topic Date Due    DTaP/Tdap/Td vaccine (1 - Tdap) Never done    Respiratory Syncytial Virus (RSV) Pregnant or age 60 yrs+ (1 - 1-dose 75+ series) Never done    Lipids  11/14/2024    Annual Wellness Visit (Medicare)  11/14/2024    COVID-19 Vaccine (8 - 2024-25 season) 11/27/2024

## 2025-04-25 NOTE — PROGRESS NOTES
HISTORY OF PRESENT ILLNESS   Solis Zepeda   is a 79 y.o.  female.  Hx chronic neck and back  Pain, mild HLD, osteoporosis on prolia, asthma and insomnia, weight loss -here with daughter Alka from [place]  Rheum Dr Rock -on prolia  URO Dr Heath  GI Dr Walters    3 week fu insomnia, heart murmur, memory loss and recurrent UTI and AWV---  Doxepine started last OV 3 mg hs  Took one dose on doxepine but then developed itchy rash-[facility].  Not sleeping well --4-5 hours or less    Referred to URO for hx recurrent IT  Referred to CARD for audible heart murmur-she does not want to see a cardiologist so did not make the appt    Walks about 30 minutes outside daily . No cp or sob when walking per pt    Referred to Neuropsych for memory loss    C/o urine frequency and pain--did not improve after 3days of cipro-had 40k e coli    Weight stable--takes ensure  can per day  Legs feel weak but able to walk 30 min per day  Last OV     Her  passed 2/2025 unfortunately from Alz Dementia -she lives alone now but has son in the local area  Not sleeping well-difficulty falling and staying asleep. Wakes up several times at night. She tries to avoid taking naps  Melatonin not effective  She took some of her husbands seroquel  Took remeron in the past but her notes was not effective. Also took ambien which was not effective     Still co dry mouth and dry eyes and dry throiat--ssa/ssb negative 2023  Saw ENT Dr Zepeda and GI Dr Marlow last year--laryngoscopy and EGD were none     C/o reucrreing uti symptoms-burnign and frequency-wants to see URO again-Dr Heath  Patient Active Problem List    Diagnosis Date Noted    Primary insomnia 06/03/2020    Gallstones 02/03/2019    HLD (hyperlipidemia) 03/10/2014    Epigastric pain 11/07/2013    Occult blood positive stool 11/07/2013    Constipation 11/07/2013    Blood in stool 11/07/2013    Interstitial cystitis 02/24/2011    Osteoporosis 02/12/2010    Asthma 02/12/2010

## 2025-04-27 ENCOUNTER — RESULTS FOLLOW-UP (OUTPATIENT)
Age: 80
End: 2025-04-27

## 2025-04-27 ENCOUNTER — TELEPHONE (OUTPATIENT)
Age: 80
End: 2025-04-27

## 2025-04-27 LAB
BACTERIA SPEC CULT: ABNORMAL
CC UR VC: ABNORMAL
SERVICE CMNT-IMP: ABNORMAL

## 2025-04-27 RX ORDER — SULFAMETHOXAZOLE AND TRIMETHOPRIM 400; 80 MG/1; MG/1
1 TABLET ORAL 2 TIMES DAILY
Qty: 14 TABLET | Refills: 0 | Status: SHIPPED | OUTPATIENT
Start: 2025-04-27 | End: 2025-05-04

## 2025-04-28 ENCOUNTER — TELEPHONE (OUTPATIENT)
Age: 80
End: 2025-04-28

## 2025-04-28 NOTE — TELEPHONE ENCOUNTER
HIPAA Verified (if caller is someone other than patient):   [x] Yes  [] No      Reason for Call: Include the reason for appointment, requested provider, & why the appointment was not scheduled at time of call:Patient calling to schedule new patient appt with Dr. Hewitt for memory loss. Referral on file.        Level 1 Calls - attempted to reach practice?  [] Yes   [] No  [x] N/A    Reason Call Marked High Priority (if applicable):

## 2025-04-30 ENCOUNTER — TELEPHONE (OUTPATIENT)
Age: 80
End: 2025-04-30

## 2025-04-30 NOTE — TELEPHONE ENCOUNTER
Pt states that she only wants to speak with Dr. Elliott pertaining to a text she received for a bill.     I advised I could have management help, but she said no only Dr. Elliott.

## 2025-04-30 NOTE — TELEPHONE ENCOUNTER
Spoke with patient.   Pt states received a new statement of $20  Advised patient will send message to PSR regarding statement and patient will need to contact billing department.

## 2025-05-07 ENCOUNTER — TELEPHONE (OUTPATIENT)
Age: 80
End: 2025-05-07

## 2025-05-07 NOTE — TELEPHONE ENCOUNTER
Pt states the medication is not working.      I do not understand what med though.  Pt is asking for a call back about this.      She needs a new ? (I am not understanding pt)

## 2025-05-07 NOTE — TELEPHONE ENCOUNTER
Spoke with patient. States will double dose and call office back if not working.   Pt verbalized understanding.     Per Dr. Elliott  Tell pt would try to take 10 mg of sonata at night-I can send in a 10 mg dose if she wants

## 2025-05-07 NOTE — TELEPHONE ENCOUNTER
Spoke with patient. C/o consistent insomina  States zaleplon isnt working. Taking medication about 30mins to 1hr before bedtime.      Please advise

## 2025-05-09 ENCOUNTER — TELEPHONE (OUTPATIENT)
Age: 80
End: 2025-05-09

## 2025-05-09 NOTE — TELEPHONE ENCOUNTER
Pt states the sleeping pills do not work for her.    What does doctor want to do?    Please call something new to [phone]    Please call pt.  She only wants Dr. Elliott or Rogelio

## 2025-05-12 DIAGNOSIS — G47.00 INSOMNIA, UNSPECIFIED TYPE: Primary | ICD-10-CM

## 2025-05-12 RX ORDER — SUVOREXANT 5 MG/1
1 TABLET, FILM COATED ORAL
Qty: 30 TABLET | Refills: 0 | Status: SHIPPED | OUTPATIENT
Start: 2025-05-12 | End: 2025-06-11

## 2025-06-06 ENCOUNTER — TELEPHONE (OUTPATIENT)
Age: 80
End: 2025-06-06

## 2025-06-06 NOTE — TELEPHONE ENCOUNTER
Tomeka states pt would like to increase dosage of Suvorexant (BELSOMRA) 5 MG TABS.    Confirmed pharmacy on file     Please call daughter to discuss.

## 2025-06-09 DIAGNOSIS — G47.00 INSOMNIA, UNSPECIFIED TYPE: Primary | ICD-10-CM

## 2025-06-09 RX ORDER — SUVOREXANT 10 MG/1
10 TABLET, FILM COATED ORAL NIGHTLY
Qty: 30 TABLET | Refills: 5 | Status: SHIPPED | OUTPATIENT
Start: 2025-06-09 | End: 2025-12-06

## 2025-08-25 RX ORDER — PRAVASTATIN SODIUM 10 MG
10 TABLET ORAL DAILY
Qty: 90 TABLET | Refills: 3 | Status: SHIPPED | OUTPATIENT
Start: 2025-08-25

## (undated) DEVICE — CATHETER IV 24GA L0.75IN OD0.6604-0.7366MM

## (undated) DEVICE — FORCEP BX LG CAP 2.4 MMX120 CM W/ NDL YEL RADIAL JAW 4 DISP

## (undated) DEVICE — CATHETER IV 18GA L1.16IN OD1.27-1.3462MM ID0.9398-1.016MM

## (undated) DEVICE — SYSTEM REPROC CBL 3 LD DISPOSABLE

## (undated) DEVICE — SET GRAV CK VLV NEEDLESS ST 3 GANGED 4WAY STPCOCK HI FLO 10

## (undated) DEVICE — CATHETER IV 20GA L1.16IN OD1.0414-1.1176MM ID0.762-0.8382MM

## (undated) DEVICE — CATHETER IV 22GA L1IN OD0.8382-0.9144MM ID0.6096-0.6858MM 382523

## (undated) DEVICE — IV START KIT: Brand: MEDLINE